# Patient Record
Sex: FEMALE | Race: WHITE | URBAN - METROPOLITAN AREA
[De-identification: names, ages, dates, MRNs, and addresses within clinical notes are randomized per-mention and may not be internally consistent; named-entity substitution may affect disease eponyms.]

---

## 2022-12-27 ENCOUNTER — OFFICE VISIT (OUTPATIENT)
Dept: SURGERY | Facility: CLINIC | Age: 57
End: 2022-12-27
Payer: COMMERCIAL

## 2022-12-27 VITALS
DIASTOLIC BLOOD PRESSURE: 76 MMHG | HEART RATE: 88 BPM | OXYGEN SATURATION: 98 % | WEIGHT: 157 LBS | RESPIRATION RATE: 16 BRPM | HEIGHT: 65.75 IN | BODY MASS INDEX: 25.53 KG/M2 | TEMPERATURE: 97 F | SYSTOLIC BLOOD PRESSURE: 123 MMHG

## 2022-12-27 DIAGNOSIS — Z01.818 PRE-OP TESTING: Primary | ICD-10-CM

## 2022-12-27 DIAGNOSIS — Z98.890 S/P BREAST RECONSTRUCTION, BILATERAL: ICD-10-CM

## 2022-12-27 PROCEDURE — 3078F DIAST BP <80 MM HG: CPT | Performed by: SURGERY

## 2022-12-27 PROCEDURE — 99243 OFF/OP CNSLTJ NEW/EST LOW 30: CPT | Performed by: SURGERY

## 2022-12-27 PROCEDURE — 3074F SYST BP LT 130 MM HG: CPT | Performed by: SURGERY

## 2022-12-27 PROCEDURE — 3008F BODY MASS INDEX DOCD: CPT | Performed by: SURGERY

## 2022-12-27 RX ORDER — LEVOTHYROXINE SODIUM 88 UG/1
88 TABLET ORAL DAILY
COMMUNITY
Start: 2022-10-23

## 2022-12-27 RX ORDER — MULTIVIT-MIN/IRON/FOLIC ACID/K 18-600-40
CAPSULE ORAL
COMMUNITY

## 2022-12-27 RX ORDER — SULFAMETHOXAZOLE AND TRIMETHOPRIM 800; 160 MG/1; MG/1
1 TABLET ORAL EVERY 12 HOURS
COMMUNITY
Start: 2022-08-10

## 2022-12-27 RX ORDER — AMLODIPINE BESYLATE 10 MG/1
10 TABLET ORAL DAILY
COMMUNITY
Start: 2022-11-07

## 2022-12-27 RX ORDER — ALPRAZOLAM 0.25 MG/1
0.25 TABLET ORAL
COMMUNITY
Start: 2022-03-21

## 2022-12-29 ENCOUNTER — TELEPHONE (OUTPATIENT)
Dept: SURGERY | Facility: CLINIC | Age: 57
End: 2022-12-29

## 2022-12-29 NOTE — TELEPHONE ENCOUNTER
Patient called to state that she is getting her CT done tomorrow. She was inquiring on when to follow up with Dr. Sonja Nicholas. I informed her we will wait for the results of the CT and reach out to her to schedule  a follow up. Patient was appreciative of the assistance and instructed me not to respond to her [employer] message as she no longer needs those questions answered.
Yes

## 2022-12-30 ENCOUNTER — HOSPITAL ENCOUNTER (OUTPATIENT)
Dept: CT IMAGING | Facility: HOSPITAL | Age: 57
Discharge: HOME | End: 2022-12-30
Attending: SURGERY
Payer: COMMERCIAL

## 2022-12-30 DIAGNOSIS — Z01.818 PRE-OP TESTING: ICD-10-CM

## 2022-12-30 LAB
CREAT BLD-MCNC: 0.9 MG/DL
GFR SERPLBLD BASED ON 1.73 SQ M-ARVRAT: 74 ML/MIN/1.73M2 (ref 60–?)

## 2022-12-30 PROCEDURE — 74174 CTA ABD&PLVS W/CONTRAST: CPT | Performed by: SURGERY

## 2022-12-30 PROCEDURE — 82565 ASSAY OF CREATININE: CPT

## 2023-01-06 ENCOUNTER — OFFICE VISIT (OUTPATIENT)
Dept: SURGERY | Facility: CLINIC | Age: 58
End: 2023-01-06
Payer: COMMERCIAL

## 2023-01-06 DIAGNOSIS — Z90.13 S/P MASTECTOMY, BILATERAL: Primary | ICD-10-CM

## 2023-01-06 PROCEDURE — 99213 OFFICE O/P EST LOW 20 MIN: CPT | Performed by: SURGERY

## 2023-01-06 NOTE — PROGRESS NOTES
Ryan Jiang is a 59year old female who presents today for a follow-up. Since her last visit, the patient underwent CT angiogram of the abdominal wall which showed some abdominal perforators and patency of the deep inferior epigastric system. She continues to have a left breast drain in place which is draining approximately 20 cc daily. Physical Examination:  Breasts: Left breast incision is clean dry and intact. Mild erythema is noted surrounding the drain insertion site. There is no left breast erythema noted. Abdomen: Well-healed laparoscopic port sites are noted. A moderate lower abdominal pannus with striations of the abdominal skin is noted. Assessment and Plan:  Surgical treatment options were reviewed with the patient. We again reviewed the option of a latissimus dorsi flap versus abdominal free flap reconstruction. The patient is interested in abdominal free flap reconstruction. The patient and  were counseled that given her previous liposuction, the feasibility of abdominal free flap reconstruction will be dependent depending on operative findings. The patient will continue management of her left breast drain and implant as per Dr. Bibi Sommer. As the patient has a trip planned in the early spring, we discussed proceeding with surgery after her trip. This will allow the swelling from abdominal liposuction completely resolved. The risks of surgery including but not limited to bleeding, infection, scarring, seroma, delayed wound healing, partial/total flap loss, fat necrosis, asymmetry, injury to adjacent structures, abdominal hernia/weakness/bulge, need for further surgery, and venous thromboembolism were reviewed. The expected post-operative course was discussed including the need for activity limitation, drains, and suture removal.   Multiple questions were answered to the patient's satisfaction. No guarantees as to outcome were offered.  The patient and  expressed understanding and wished to proceed. A total of 20 minutes was spent reviewing the patient's diagnostic testing, reviewing reconstructive options, and coordinating the patient's care.

## 2023-01-06 NOTE — PATIENT INSTRUCTIONS
Surgeon:         Dr. Timothy See                                        Tel:         [phone]                                  Fax:        [phone]    Surgery/Procedure:  Delayed breast reconstruction with bilateral LAY FLAP. 12 hours, general anesthesia, admission to ICU. Hospital:  [facility]: [address], [address]           [phone]  [facility]: [address], [facility], [facility]               [phone]    1. Someone will need to drive you to and from the hospital if your procedure is outpatient. 2.Do not drink alcohol or smoke 24 hours prior to your procedure. 3. Bring a picture ID and your insurance card. 4. You will be contacted by the hospital the day before to confirm the procedure time and location. 5. The hospital will also contact you approximately one week before surgery to schedule your COVID test.     6. Do not take any herbal supplements or blood thinners at least one week before your procedure/surgery. This includes NSAID's (aspirin, baby aspirin, Motrin, Ibuprofen, Aleve, Advil, Naproxen, etc), Plavix, fish oil, vitamin E, turmeric, CoQ10, or green tea supplements, etc. *TYLENOL or acetaminophen is ok to take*    7. PRE-OPERATIVE TESTING: History and physical with medical clearance is REQUIRED within 30 days of the surgery date and is mandatory per Dr. Kuldip Skinner. *If this is not done, your surgery will be postponed*  MEDICAL CLEARANCE WITH PCP   CBC  CMP  EKG    8. Please inform us if you develop any Covid-19 like symptoms, test positive or have been exposed for Covid- 19 prior to surgery.      Consent obtained   Photos taken on 1/6/2023

## 2023-01-17 ENCOUNTER — TELEPHONE (OUTPATIENT)
Dept: SURGERY | Facility: CLINIC | Age: 58
End: 2023-01-17

## 2023-01-17 DIAGNOSIS — Z90.13 S/P MASTECTOMY, BILATERAL: Primary | ICD-10-CM

## 2023-01-17 NOTE — TELEPHONE ENCOUNTER
Calling pt in regards to scheduling surgery. Informed pt that I have 06/05/2023 available at [facility] with Dr. Alyssa Musa. Insurance is the same  Pt verbalized understanding and in agreement with date and location. All questions answered. Encouraged pt to call or [employer] message office with any other questions or concerns.

## 2023-05-24 ENCOUNTER — TELEPHONE (OUTPATIENT)
Dept: SURGERY | Facility: CLINIC | Age: 58
End: 2023-05-24

## 2023-05-24 NOTE — TELEPHONE ENCOUNTER
Called Dr. Quiana Vazquez office and spoke to [employer] to request EKG tracing to be faxed to our office. Oneal confirms this will be sent to our office in 48 hours.

## 2023-05-30 ENCOUNTER — TELEPHONE (OUTPATIENT)
Dept: SURGERY | Facility: CLINIC | Age: 58
End: 2023-05-30

## 2023-05-30 DIAGNOSIS — Z98.890 S/P BREAST RECONSTRUCTION, BILATERAL: ICD-10-CM

## 2023-05-30 DIAGNOSIS — Z90.13 S/P MASTECTOMY, BILATERAL: Primary | ICD-10-CM

## 2023-05-30 NOTE — TELEPHONE ENCOUNTER
Spoke to [employer]. at Dr. Analia Alvarenga office to have medical clearance updated with abnormal EKG.

## 2023-05-30 NOTE — TELEPHONE ENCOUNTER
Patient called back and confirmed new surgical date. She was informed that she will need a new medical clearance prior to surgery. Patient was appreciative of the call.

## 2023-07-07 ENCOUNTER — ANESTHESIA EVENT (OUTPATIENT)
Dept: SURGERY | Facility: HOSPITAL | Age: 58
End: 2023-07-07
Payer: COMMERCIAL

## 2023-07-11 ENCOUNTER — PATIENT MESSAGE (OUTPATIENT)
Dept: SURGERY | Facility: CLINIC | Age: 58
End: 2023-07-11

## 2023-07-11 DIAGNOSIS — L03.319 CELLULITIS OF TRUNK, UNSPECIFIED SITE OF TRUNK: Primary | ICD-10-CM

## 2023-07-13 ENCOUNTER — TELEPHONE (OUTPATIENT)
Dept: SURGERY | Facility: CLINIC | Age: 58
End: 2023-07-13

## 2023-07-13 NOTE — TELEPHONE ENCOUNTER
Spoke to More in pre-admission testing who wanted to confirm ERAS protocol with bowel prep. No bowel prep needed for this surgery.

## 2023-07-18 ENCOUNTER — TELEPHONE (OUTPATIENT)
Dept: SURGERY | Facility: CLINIC | Age: 58
End: 2023-07-18

## 2023-07-19 ENCOUNTER — TELEPHONE (OUTPATIENT)
Dept: SURGERY | Facility: CLINIC | Age: 58
End: 2023-07-19

## 2023-07-24 ENCOUNTER — ANESTHESIA (OUTPATIENT)
Dept: SURGERY | Facility: HOSPITAL | Age: 58
End: 2023-07-24
Payer: COMMERCIAL

## 2023-07-24 ENCOUNTER — HOSPITAL ENCOUNTER (INPATIENT)
Facility: HOSPITAL | Age: 58
LOS: 3 days | Discharge: HOME | DRG: 580 | End: 2023-07-27
Attending: SURGERY | Admitting: SURGERY
Payer: COMMERCIAL

## 2023-07-24 DIAGNOSIS — Z90.13 S/P MASTECTOMY, BILATERAL: ICD-10-CM

## 2023-07-24 LAB
ALBUMIN SERPL-MCNC: 3 G/DL (ref 3.4–5)
ALBUMIN/GLOB SERPL: 0.9 {RATIO} (ref 1–2)
ALP LIVER SERPL-CCNC: 78 U/L
ALT SERPL-CCNC: 249 U/L
ANION GAP SERPL CALC-SCNC: 6 MMOL/L (ref 0–18)
AST SERPL-CCNC: 382 U/L (ref 15–37)
BASOPHILS # BLD AUTO: 0.02 X10(3) UL (ref 0–0.2)
BASOPHILS NFR BLD AUTO: 0.1 %
BILIRUB SERPL-MCNC: 0.5 MG/DL (ref 0.1–2)
BUN BLD-MCNC: 11 MG/DL (ref 7–18)
CALCIUM BLD-MCNC: 8.6 MG/DL (ref 8.5–10.1)
CHLORIDE SERPL-SCNC: 109 MMOL/L (ref 98–112)
CO2 SERPL-SCNC: 23 MMOL/L (ref 21–32)
CREAT BLD-MCNC: 0.82 MG/DL
EGFRCR SERPLBLD CKD-EPI 2021: 82 ML/MIN/1.73M2 (ref 60–?)
EOSINOPHIL # BLD AUTO: 0.01 X10(3) UL (ref 0–0.7)
EOSINOPHIL NFR BLD AUTO: 0.1 %
ERYTHROCYTE [DISTWIDTH] IN BLOOD BY AUTOMATED COUNT: 14 %
GLOBULIN PLAS-MCNC: 3.3 G/DL (ref 2.8–4.4)
GLUCOSE BLD-MCNC: 182 MG/DL (ref 70–99)
HCT VFR BLD AUTO: 40.2 %
HGB BLD-MCNC: 13 G/DL
IMM GRANULOCYTES # BLD AUTO: 0.08 X10(3) UL (ref 0–1)
IMM GRANULOCYTES NFR BLD: 0.5 %
LYMPHOCYTES # BLD AUTO: 0.4 X10(3) UL (ref 1–4)
LYMPHOCYTES NFR BLD AUTO: 2.5 %
MCH RBC QN AUTO: 28.1 PG (ref 26–34)
MCHC RBC AUTO-ENTMCNC: 32.3 G/DL (ref 31–37)
MCV RBC AUTO: 86.8 FL
MONOCYTES # BLD AUTO: 0.89 X10(3) UL (ref 0.1–1)
MONOCYTES NFR BLD AUTO: 5.7 %
NEUTROPHILS # BLD AUTO: 14.31 X10 (3) UL (ref 1.5–7.7)
NEUTROPHILS # BLD AUTO: 14.31 X10(3) UL (ref 1.5–7.7)
NEUTROPHILS NFR BLD AUTO: 91.1 %
OSMOLALITY SERPL CALC.SUM OF ELEC: 290 MOSM/KG (ref 275–295)
PLATELET # BLD AUTO: 193 10(3)UL (ref 150–450)
POTASSIUM SERPL-SCNC: 3.8 MMOL/L (ref 3.5–5.1)
PROT SERPL-MCNC: 6.3 G/DL (ref 6.4–8.2)
RBC # BLD AUTO: 4.63 X10(6)UL
SODIUM SERPL-SCNC: 138 MMOL/L (ref 136–145)
WBC # BLD AUTO: 15.7 X10(3) UL (ref 4–11)

## 2023-07-24 PROCEDURE — 0WQF0ZZ REPAIR ABDOMINAL WALL, OPEN APPROACH: ICD-10-PCS | Performed by: SURGERY

## 2023-07-24 PROCEDURE — 3008F BODY MASS INDEX DOCD: CPT | Performed by: NURSE PRACTITIONER

## 2023-07-24 PROCEDURE — 0KQL0ZZ REPAIR LEFT ABDOMEN MUSCLE, OPEN APPROACH: ICD-10-PCS | Performed by: SURGERY

## 2023-07-24 PROCEDURE — 0KXF0Z7 TRANSFER RIGHT TRUNK MUSCLE, DEEP INFERIOR EPIGASTRIC ARTERY PERFORATOR FLAP, OPEN APPROACH: ICD-10-PCS | Performed by: SURGERY

## 2023-07-24 PROCEDURE — 0KXG0Z7 TRANSFER LEFT TRUNK MUSCLE, DEEP INFERIOR EPIGASTRIC ARTERY PERFORATOR FLAP, OPEN APPROACH: ICD-10-PCS | Performed by: SURGERY

## 2023-07-24 PROCEDURE — 0HPT0JZ REMOVAL OF SYNTHETIC SUBSTITUTE FROM RIGHT BREAST, OPEN APPROACH: ICD-10-PCS | Performed by: SURGERY

## 2023-07-24 PROCEDURE — 99291 CRITICAL CARE FIRST HOUR: CPT | Performed by: NURSE PRACTITIONER

## 2023-07-24 PROCEDURE — 0HPU0JZ REMOVAL OF SYNTHETIC SUBSTITUTE FROM LEFT BREAST, OPEN APPROACH: ICD-10-PCS | Performed by: SURGERY

## 2023-07-24 PROCEDURE — 76942 ECHO GUIDE FOR BIOPSY: CPT | Performed by: ANESTHESIOLOGY

## 2023-07-24 PROCEDURE — 3074F SYST BP LT 130 MM HG: CPT | Performed by: NURSE PRACTITIONER

## 2023-07-24 PROCEDURE — 3078F DIAST BP <80 MM HG: CPT | Performed by: NURSE PRACTITIONER

## 2023-07-24 PROCEDURE — 3E0T3BZ INTRODUCTION OF ANESTHETIC AGENT INTO PERIPHERAL NERVES AND PLEXI, PERCUTANEOUS APPROACH: ICD-10-PCS | Performed by: SURGERY

## 2023-07-24 RX ORDER — DEXAMETHASONE SODIUM PHOSPHATE 10 MG/ML
INJECTION, SOLUTION INTRAMUSCULAR; INTRAVENOUS AS NEEDED
Status: DISCONTINUED | OUTPATIENT
Start: 2023-07-24 | End: 2023-07-24 | Stop reason: SURG

## 2023-07-24 RX ORDER — HYDROMORPHONE HYDROCHLORIDE 1 MG/ML
0.2 INJECTION, SOLUTION INTRAMUSCULAR; INTRAVENOUS; SUBCUTANEOUS EVERY 5 MIN PRN
Status: ACTIVE | OUTPATIENT
Start: 2023-07-24 | End: 2023-07-25

## 2023-07-24 RX ORDER — OXYCODONE HYDROCHLORIDE 5 MG/1
5 TABLET ORAL EVERY 4 HOURS PRN
Status: DISCONTINUED | OUTPATIENT
Start: 2023-07-24 | End: 2023-07-27

## 2023-07-24 RX ORDER — CARBOXYMETHYLCELLULOSE SODIUM 10 MG/ML
GEL OPHTHALMIC AS NEEDED
Status: DISCONTINUED | OUTPATIENT
Start: 2023-07-24 | End: 2023-07-24 | Stop reason: SURG

## 2023-07-24 RX ORDER — MIDAZOLAM HYDROCHLORIDE 1 MG/ML
INJECTION INTRAMUSCULAR; INTRAVENOUS AS NEEDED
Status: DISCONTINUED | OUTPATIENT
Start: 2023-07-24 | End: 2023-07-24 | Stop reason: SURG

## 2023-07-24 RX ORDER — HEPARIN SODIUM 5000 [USP'U]/ML
INJECTION, SOLUTION INTRAVENOUS; SUBCUTANEOUS
Status: COMPLETED
Start: 2023-07-24 | End: 2023-07-24

## 2023-07-24 RX ORDER — ONDANSETRON 2 MG/ML
INJECTION INTRAMUSCULAR; INTRAVENOUS AS NEEDED
Status: DISCONTINUED | OUTPATIENT
Start: 2023-07-24 | End: 2023-07-24 | Stop reason: SURG

## 2023-07-24 RX ORDER — ACETAMINOPHEN 325 MG/1
650 TABLET ORAL 4 TIMES DAILY
Status: DISCONTINUED | OUTPATIENT
Start: 2023-07-24 | End: 2023-07-26

## 2023-07-24 RX ORDER — SODIUM CHLORIDE, SODIUM LACTATE, POTASSIUM CHLORIDE, CALCIUM CHLORIDE 600; 310; 30; 20 MG/100ML; MG/100ML; MG/100ML; MG/100ML
INJECTION, SOLUTION INTRAVENOUS CONTINUOUS
Status: DISCONTINUED | OUTPATIENT
Start: 2023-07-24 | End: 2023-07-26

## 2023-07-24 RX ORDER — HEPARIN SODIUM 5000 [USP'U]/ML
5000 INJECTION, SOLUTION INTRAVENOUS; SUBCUTANEOUS ONCE
Status: COMPLETED | OUTPATIENT
Start: 2023-07-24 | End: 2023-07-24

## 2023-07-24 RX ORDER — DOCUSATE SODIUM 100 MG/1
100 CAPSULE, LIQUID FILLED ORAL 2 TIMES DAILY
Status: DISCONTINUED | OUTPATIENT
Start: 2023-07-25 | End: 2023-07-27

## 2023-07-24 RX ORDER — ROCURONIUM BROMIDE 10 MG/ML
INJECTION, SOLUTION INTRAVENOUS AS NEEDED
Status: DISCONTINUED | OUTPATIENT
Start: 2023-07-24 | End: 2023-07-24 | Stop reason: SURG

## 2023-07-24 RX ORDER — DEXAMETHASONE SODIUM PHOSPHATE 4 MG/ML
VIAL (ML) INJECTION AS NEEDED
Status: DISCONTINUED | OUTPATIENT
Start: 2023-07-24 | End: 2023-07-24 | Stop reason: SURG

## 2023-07-24 RX ORDER — OXYCODONE HYDROCHLORIDE 5 MG/1
10 TABLET ORAL EVERY 4 HOURS PRN
Status: DISCONTINUED | OUTPATIENT
Start: 2023-07-24 | End: 2023-07-27

## 2023-07-24 RX ORDER — MIDAZOLAM HYDROCHLORIDE 1 MG/ML
1 INJECTION INTRAMUSCULAR; INTRAVENOUS EVERY 5 MIN PRN
Status: ACTIVE | OUTPATIENT
Start: 2023-07-24 | End: 2023-07-25

## 2023-07-24 RX ORDER — HYDROMORPHONE HYDROCHLORIDE 1 MG/ML
0.6 INJECTION, SOLUTION INTRAMUSCULAR; INTRAVENOUS; SUBCUTANEOUS EVERY 5 MIN PRN
Status: ACTIVE | OUTPATIENT
Start: 2023-07-24 | End: 2023-07-25

## 2023-07-24 RX ORDER — DOCUSATE SODIUM 100 MG/1
100 CAPSULE, LIQUID FILLED ORAL 2 TIMES DAILY
Qty: 40 CAPSULE | Refills: 1 | Status: SHIPPED | OUTPATIENT
Start: 2023-07-24

## 2023-07-24 RX ORDER — DIPHENHYDRAMINE HYDROCHLORIDE 50 MG/ML
12.5 INJECTION INTRAMUSCULAR; INTRAVENOUS EVERY 6 HOURS PRN
Status: DISCONTINUED | OUTPATIENT
Start: 2023-07-24 | End: 2023-07-27

## 2023-07-24 RX ORDER — CYCLOBENZAPRINE HCL 5 MG
5 TABLET ORAL 3 TIMES DAILY PRN
Status: DISCONTINUED | OUTPATIENT
Start: 2023-07-24 | End: 2023-07-27

## 2023-07-24 RX ORDER — ONDANSETRON 2 MG/ML
4 INJECTION INTRAMUSCULAR; INTRAVENOUS EVERY 6 HOURS PRN
Status: DISCONTINUED | OUTPATIENT
Start: 2023-07-24 | End: 2023-07-27

## 2023-07-24 RX ORDER — ASPIRIN 81 MG/1
81 TABLET, CHEWABLE ORAL DAILY
Status: DISCONTINUED | OUTPATIENT
Start: 2023-07-25 | End: 2023-07-27

## 2023-07-24 RX ORDER — VERAPAMIL HYDROCHLORIDE 2.5 MG/ML
INJECTION, SOLUTION INTRAVENOUS AS NEEDED
Status: DISCONTINUED | OUTPATIENT
Start: 2023-07-24 | End: 2023-07-24 | Stop reason: HOSPADM

## 2023-07-24 RX ORDER — ONDANSETRON 4 MG/1
4 TABLET, ORALLY DISINTEGRATING ORAL EVERY 6 HOURS PRN
Status: DISCONTINUED | OUTPATIENT
Start: 2023-07-24 | End: 2023-07-27

## 2023-07-24 RX ORDER — ACETAMINOPHEN 10 MG/ML
INJECTION, SOLUTION INTRAVENOUS AS NEEDED
Status: DISCONTINUED | OUTPATIENT
Start: 2023-07-24 | End: 2023-07-24 | Stop reason: SURG

## 2023-07-24 RX ORDER — ACETAMINOPHEN 500 MG
1000 TABLET ORAL ONCE
Status: DISCONTINUED | OUTPATIENT
Start: 2023-07-24 | End: 2023-07-24 | Stop reason: HOSPADM

## 2023-07-24 RX ORDER — SODIUM CHLORIDE, SODIUM LACTATE, POTASSIUM CHLORIDE, CALCIUM CHLORIDE 600; 310; 30; 20 MG/100ML; MG/100ML; MG/100ML; MG/100ML
INJECTION, SOLUTION INTRAVENOUS CONTINUOUS
Status: DISCONTINUED | OUTPATIENT
Start: 2023-07-24 | End: 2023-07-27

## 2023-07-24 RX ORDER — CEFAZOLIN SODIUM/WATER 2 G/20 ML
2 SYRINGE (ML) INTRAVENOUS EVERY 8 HOURS
Status: COMPLETED | OUTPATIENT
Start: 2023-07-24 | End: 2023-07-25

## 2023-07-24 RX ORDER — HYDROMORPHONE HYDROCHLORIDE 1 MG/ML
0.8 INJECTION, SOLUTION INTRAMUSCULAR; INTRAVENOUS; SUBCUTANEOUS EVERY 2 HOUR PRN
Status: DISCONTINUED | OUTPATIENT
Start: 2023-07-24 | End: 2023-07-27

## 2023-07-24 RX ORDER — HYDROMORPHONE HYDROCHLORIDE 1 MG/ML
0.4 INJECTION, SOLUTION INTRAMUSCULAR; INTRAVENOUS; SUBCUTANEOUS EVERY 5 MIN PRN
Status: ACTIVE | OUTPATIENT
Start: 2023-07-24 | End: 2023-07-25

## 2023-07-24 RX ORDER — GLYCOPYRROLATE 0.2 MG/ML
INJECTION, SOLUTION INTRAMUSCULAR; INTRAVENOUS AS NEEDED
Status: DISCONTINUED | OUTPATIENT
Start: 2023-07-24 | End: 2023-07-24 | Stop reason: SURG

## 2023-07-24 RX ORDER — PROCHLORPERAZINE EDISYLATE 5 MG/ML
5 INJECTION INTRAMUSCULAR; INTRAVENOUS EVERY 8 HOURS PRN
Status: DISCONTINUED | OUTPATIENT
Start: 2023-07-24 | End: 2023-07-27

## 2023-07-24 RX ORDER — ONDANSETRON 4 MG/1
4 TABLET, FILM COATED ORAL EVERY 8 HOURS PRN
Qty: 20 TABLET | Refills: 1 | Status: SHIPPED | OUTPATIENT
Start: 2023-07-24

## 2023-07-24 RX ORDER — KETAMINE HYDROCHLORIDE 50 MG/ML
INJECTION, SOLUTION, CONCENTRATE INTRAMUSCULAR; INTRAVENOUS AS NEEDED
Status: DISCONTINUED | OUTPATIENT
Start: 2023-07-24 | End: 2023-07-24 | Stop reason: SURG

## 2023-07-24 RX ORDER — DIPHENHYDRAMINE HCL 25 MG
25 CAPSULE ORAL EVERY 6 HOURS PRN
Status: DISCONTINUED | OUTPATIENT
Start: 2023-07-24 | End: 2023-07-27

## 2023-07-24 RX ORDER — LIDOCAINE HYDROCHLORIDE 10 MG/ML
INJECTION, SOLUTION EPIDURAL; INFILTRATION; INTRACAUDAL; PERINEURAL AS NEEDED
Status: DISCONTINUED | OUTPATIENT
Start: 2023-07-24 | End: 2023-07-24 | Stop reason: SURG

## 2023-07-24 RX ORDER — EPHEDRINE SULFATE 50 MG/ML
INJECTION INTRAVENOUS AS NEEDED
Status: DISCONTINUED | OUTPATIENT
Start: 2023-07-24 | End: 2023-07-24 | Stop reason: SURG

## 2023-07-24 RX ORDER — NEOSTIGMINE METHYLSULFATE 1 MG/ML
INJECTION, SOLUTION INTRAVENOUS AS NEEDED
Status: DISCONTINUED | OUTPATIENT
Start: 2023-07-24 | End: 2023-07-24 | Stop reason: SURG

## 2023-07-24 RX ORDER — CEFAZOLIN SODIUM/WATER 2 G/20 ML
SYRINGE (ML) INTRAVENOUS
Status: COMPLETED
Start: 2023-07-24 | End: 2023-07-24

## 2023-07-24 RX ORDER — PANTOPRAZOLE SODIUM 40 MG/1
40 TABLET, DELAYED RELEASE ORAL
Status: DISCONTINUED | OUTPATIENT
Start: 2023-07-25 | End: 2023-07-27

## 2023-07-24 RX ORDER — CEFAZOLIN SODIUM/WATER 2 G/20 ML
2 SYRINGE (ML) INTRAVENOUS ONCE
Status: COMPLETED | OUTPATIENT
Start: 2023-07-24 | End: 2023-07-24

## 2023-07-24 RX ORDER — HYDROMORPHONE HYDROCHLORIDE 1 MG/ML
0.4 INJECTION, SOLUTION INTRAMUSCULAR; INTRAVENOUS; SUBCUTANEOUS EVERY 2 HOUR PRN
Status: DISCONTINUED | OUTPATIENT
Start: 2023-07-24 | End: 2023-07-27

## 2023-07-24 RX ORDER — HEPARIN SODIUM 10000 [USP'U]/ML
INJECTION, SOLUTION INTRAVENOUS; SUBCUTANEOUS AS NEEDED
Status: DISCONTINUED | OUTPATIENT
Start: 2023-07-24 | End: 2023-07-24 | Stop reason: HOSPADM

## 2023-07-24 RX ORDER — LIDOCAINE HYDROCHLORIDE ANHYDROUS AND DEXTROSE MONOHYDRATE .8; 5 G/100ML; G/100ML
INJECTION, SOLUTION INTRAVENOUS CONTINUOUS PRN
Status: DISCONTINUED | OUTPATIENT
Start: 2023-07-24 | End: 2023-07-24 | Stop reason: SURG

## 2023-07-24 RX ORDER — MEPERIDINE HYDROCHLORIDE 25 MG/ML
12.5 INJECTION INTRAMUSCULAR; INTRAVENOUS; SUBCUTANEOUS AS NEEDED
Status: DISCONTINUED | OUTPATIENT
Start: 2023-07-24 | End: 2023-07-27

## 2023-07-24 RX ORDER — DIPHENHYDRAMINE HYDROCHLORIDE 50 MG/ML
12.5 INJECTION INTRAMUSCULAR; INTRAVENOUS AS NEEDED
Status: ACTIVE | OUTPATIENT
Start: 2023-07-24 | End: 2023-07-25

## 2023-07-24 RX ORDER — HYDROCODONE BITARTRATE AND ACETAMINOPHEN 5; 325 MG/1; MG/1
1-2 TABLET ORAL EVERY 6 HOURS PRN
Qty: 40 TABLET | Refills: 0 | Status: SHIPPED | OUTPATIENT
Start: 2023-07-24

## 2023-07-24 RX ORDER — ENOXAPARIN SODIUM 100 MG/ML
40 INJECTION SUBCUTANEOUS DAILY
Status: DISCONTINUED | OUTPATIENT
Start: 2023-07-25 | End: 2023-07-27

## 2023-07-24 RX ORDER — ASPIRIN 81 MG/1
81 TABLET ORAL DAILY
Qty: 14 TABLET | Refills: 0 | Status: SHIPPED | OUTPATIENT
Start: 2023-07-24

## 2023-07-24 RX ORDER — SCOLOPAMINE TRANSDERMAL SYSTEM 1 MG/1
1 PATCH, EXTENDED RELEASE TRANSDERMAL ONCE
Status: COMPLETED | OUTPATIENT
Start: 2023-07-24 | End: 2023-07-24

## 2023-07-24 RX ORDER — ONDANSETRON 2 MG/ML
4 INJECTION INTRAMUSCULAR; INTRAVENOUS EVERY 6 HOURS PRN
Status: DISCONTINUED | OUTPATIENT
Start: 2023-07-24 | End: 2023-07-24 | Stop reason: DRUGHIGH

## 2023-07-24 RX ORDER — ASPIRIN 300 MG/1
SUPPOSITORY RECTAL AS NEEDED
Status: DISCONTINUED | OUTPATIENT
Start: 2023-07-24 | End: 2023-07-24 | Stop reason: HOSPADM

## 2023-07-24 RX ADMIN — ROCURONIUM BROMIDE 10 MG: 10 INJECTION, SOLUTION INTRAVENOUS at 14:34:00

## 2023-07-24 RX ADMIN — KETAMINE HYDROCHLORIDE 50 MG: 50 INJECTION, SOLUTION, CONCENTRATE INTRAMUSCULAR; INTRAVENOUS at 07:55:00

## 2023-07-24 RX ADMIN — ONDANSETRON 4 MG: 2 INJECTION INTRAMUSCULAR; INTRAVENOUS at 17:06:00

## 2023-07-24 RX ADMIN — CEFAZOLIN SODIUM/WATER 2 G: 2 G/20 ML SYRINGE (ML) INTRAVENOUS at 15:47:00

## 2023-07-24 RX ADMIN — ROCURONIUM BROMIDE 20 MG: 10 INJECTION, SOLUTION INTRAVENOUS at 13:17:00

## 2023-07-24 RX ADMIN — ACETAMINOPHEN 1000 MG: 10 INJECTION, SOLUTION INTRAVENOUS at 16:45:00

## 2023-07-24 RX ADMIN — CARBOXYMETHYLCELLULOSE SODIUM 1 DROP: 10 GEL OPHTHALMIC at 07:44:00

## 2023-07-24 RX ADMIN — SODIUM CHLORIDE, SODIUM LACTATE, POTASSIUM CHLORIDE, CALCIUM CHLORIDE: 600; 310; 30; 20 INJECTION, SOLUTION INTRAVENOUS at 07:35:00

## 2023-07-24 RX ADMIN — DEXAMETHASONE SODIUM PHOSPHATE 8 MG: 4 MG/ML VIAL (ML) INJECTION at 07:54:00

## 2023-07-24 RX ADMIN — SODIUM CHLORIDE, SODIUM LACTATE, POTASSIUM CHLORIDE, CALCIUM CHLORIDE: 600; 310; 30; 20 INJECTION, SOLUTION INTRAVENOUS at 09:58:00

## 2023-07-24 RX ADMIN — MIDAZOLAM HYDROCHLORIDE 4 MG: 1 INJECTION INTRAMUSCULAR; INTRAVENOUS at 07:34:00

## 2023-07-24 RX ADMIN — ROCURONIUM BROMIDE 80 MG: 10 INJECTION, SOLUTION INTRAVENOUS at 07:44:00

## 2023-07-24 RX ADMIN — ONDANSETRON 4 MG: 2 INJECTION INTRAMUSCULAR; INTRAVENOUS at 16:32:00

## 2023-07-24 RX ADMIN — EPHEDRINE SULFATE 5 MG: 50 INJECTION INTRAVENOUS at 12:57:00

## 2023-07-24 RX ADMIN — CEFAZOLIN SODIUM/WATER 2 G: 2 G/20 ML SYRINGE (ML) INTRAVENOUS at 11:52:00

## 2023-07-24 RX ADMIN — ROCURONIUM BROMIDE 20 MG: 10 INJECTION, SOLUTION INTRAVENOUS at 11:46:00

## 2023-07-24 RX ADMIN — SODIUM CHLORIDE, SODIUM LACTATE, POTASSIUM CHLORIDE, CALCIUM CHLORIDE: 600; 310; 30; 20 INJECTION, SOLUTION INTRAVENOUS at 11:00:00

## 2023-07-24 RX ADMIN — ROCURONIUM BROMIDE 20 MG: 10 INJECTION, SOLUTION INTRAVENOUS at 10:19:00

## 2023-07-24 RX ADMIN — LIDOCAINE HYDROCHLORIDE ANHYDROUS AND DEXTROSE MONOHYDRATE 2 MG/KG/HR: .8; 5 INJECTION, SOLUTION INTRAVENOUS at 07:50:00

## 2023-07-24 RX ADMIN — SCOLOPAMINE TRANSDERMAL SYSTEM 1 PATCH: 1 PATCH, EXTENDED RELEASE TRANSDERMAL at 08:01:00

## 2023-07-24 RX ADMIN — SODIUM CHLORIDE, SODIUM LACTATE, POTASSIUM CHLORIDE, CALCIUM CHLORIDE: 600; 310; 30; 20 INJECTION, SOLUTION INTRAVENOUS at 17:04:00

## 2023-07-24 RX ADMIN — LIDOCAINE HYDROCHLORIDE 50 MG: 10 INJECTION, SOLUTION EPIDURAL; INFILTRATION; INTRACAUDAL; PERINEURAL at 07:43:00

## 2023-07-24 RX ADMIN — DEXAMETHASONE SODIUM PHOSPHATE 4 MG: 10 INJECTION, SOLUTION INTRAMUSCULAR; INTRAVENOUS at 17:03:00

## 2023-07-24 RX ADMIN — GLYCOPYRROLATE 0.4 MG: 0.2 INJECTION, SOLUTION INTRAMUSCULAR; INTRAVENOUS at 16:44:00

## 2023-07-24 RX ADMIN — CEFAZOLIN SODIUM/WATER 2 G: 2 G/20 ML SYRINGE (ML) INTRAVENOUS at 07:59:00

## 2023-07-24 RX ADMIN — SODIUM CHLORIDE, SODIUM LACTATE, POTASSIUM CHLORIDE, CALCIUM CHLORIDE: 600; 310; 30; 20 INJECTION, SOLUTION INTRAVENOUS at 16:00:00

## 2023-07-24 RX ADMIN — SODIUM CHLORIDE, SODIUM LACTATE, POTASSIUM CHLORIDE, CALCIUM CHLORIDE: 600; 310; 30; 20 INJECTION, SOLUTION INTRAVENOUS at 14:00:00

## 2023-07-24 RX ADMIN — ROCURONIUM BROMIDE 20 MG: 10 INJECTION, SOLUTION INTRAVENOUS at 09:19:00

## 2023-07-24 RX ADMIN — NEOSTIGMINE METHYLSULFATE 4 MG: 1 INJECTION, SOLUTION INTRAVENOUS at 16:44:00

## 2023-07-24 NOTE — BRIEF OP NOTE
Pre-Operative Diagnosis: S/P mastectomy, bilateral [Z90.13]     Post-Operative Diagnosis: S/P mastectomy, bilateral [Z90.13]      Procedure Performed:   Delayed breast reconstruction with bilateral Deep inferior epigastric   flap.     Surgeon(s) and Role:     * Ibeth Nunez MD - Primary     * Katie Eckert MD - Assisting Surgeon    Assistant(s):  Surgical Assistant.: Benedicto Barnes     Surgical Findings: Flaps well-perfused at completion     Specimen: bilateral breast tissue     Estimated Blood Loss:50ml      Etta Tavera MD  7/24/2023  5:33 PM

## 2023-07-24 NOTE — H&P
The plan for removal of bilateral breast implants and delayed reconstruction with abdominal free flap reviewed with patient and . Given her previous lower abdominal liposuction, we discussed possible inability to perform flap based reconstruction. In this event, the patient wishes to proceed with capsulectomy and implant removal for a flap closure. The general risks of surgery including but not limited to bleeding, infection, scarring, seroma, asymmetry, partial/total flap loss, fat necrosis, asymmetry, injury to adjacent structures, abdominal hernia/weakness/bulge, need for further surgery, and venous thromboembolism were reviewed. The expected post-operative course was reviewed. Questions were answered to the patient and 's satisfaction. No guarantees as to outcome were offered. The patient expresses understanding and wishes to proceed.

## 2023-07-24 NOTE — ANESTHESIA PROCEDURE NOTES
Regional Block    Performed by: Katharine Motley MD  Authorized by: Katharine Motley MD      General Information and Staff    Start Time:   Anesthesiologist:  Katharine Motley MD  Performed by: Anesthesiologist  Patient Location:  OR    Block Placement: Post Induction  Site Identification: real time ultrasound guided and image stored and retrievable    Block site/laterality marked before start: site marked  Reason for Block: at surgeon's request and post-op pain management    Preanesthetic Checklist: 2 patient identifers, IV checked, site marked, risks and benefits discussed, monitors and equipment checked, pre-op evaluation, timeout performed, anesthesia consent, sterile technique used, no prohibitive neurological deficits and no local skin infection at insertion site      Procedure Details    Patient Position:  Supine  Prep: ChloraPrep    Monitoring:  Heart rate, cardiac monitor, continuous pulse ox and blood pressure cuff  Block Type:  TAP  Laterality:  Bilateral  Injection Technique:  Single-shot    Needle    Needle Type:  Echogenic  Needle Gauge:  21 G  Needle length: 110 mm. Needle Localization:  Ultrasound guidance  Reason for Ultrasound Use: appropriate spread of the medication was noted in real time and no ultrasound evidence of intravascular and/or intraneural injection        Muscle Twitch Response Comment: no response    Assessment    Injection Assessment:  Good spread noted, incremental injection, local visualized surrounding nerve on ultrasound, low pressure, negative aspiration for heme and negative resistance  Heart Rate Change: No    - Patient tolerated block procedure well without evidence of immediate block related complications.      Medications      Additional Comments    Ropivacaine 0.25% 30cc, decadron 2 mg PF each side SPECIFIC INSTRUCTIONS BELOW Unithroid  150   mcg  A day, BMN   on empty stomach with water only, no other meds or food or drinks   For next half hour Take any kind of vitamins, calcium, iron   Pills  4 hours later 
 
---------------------------------------------------------------------- 
 
 
PAY ATTENTION TO THESE GENERAL INSTRUCTIONS  
 
-ANY tests other than blood work, which you opt to do  outside [facility] imaging facilities, you are responsible for prior authorizations if  required  
- [phone] [facility] AVS- PLEASE IGNORE  
- YOUR MED LIST IS NOT UP TO DATE AS SOME CHANGES ARE BEING MADE AFTER THE VISIT - [facility] Results *Normal results will not be notified by a phone call starting January 1 2021 *If you have an upcoming visit, the results will be discussed at the visit *Please sign up for MY CHART if you want access to your lab and test results *Abnormal results which require immediate attention will be notified by phone call *Abnormal results which do not require immediate assistance will be notified in 1-2 weeks Refills    -    have your pharmacy send us a refill request 
Phone calls  -  Allow  24 hrs. for non-urgent calls to be returned Prior authorization - It may take 2-4 weeks to process Forms  -  FMLA, DMV etc., will take up to 2 weeks to process Cancellations - please notify the office 2 days in advance Samples  - will only be dispensed at visits  
 
--------------------------------------------------------------------------------------------

## 2023-07-24 NOTE — DISCHARGE INSTRUCTIONS
Plastic Surgery Home Care Instructions      We hope you were pleased with your care at [facility].  We wish you the best outcome and overall experience with your operation. These instructions will help to minimize pain, optimize healing, and improve the likelihood of a successful result. What To Expect  There will be some spotting of the incision lines for the next 1-2 weeks. Your breasts will be swollen and might feel congested for the next 1-2 weeks  Temporary areas of numbness are typical in the early weeks following a breast procedure. Normalization of sensation can typically take up to several months following the operation. Your abdomen, back, chest, shoulder will be sore and stiff. Stretching exercises and other treatments will be recommended in the future as you heal.    Bandages (Dressing)  Keep dressings clean and dry  Do NOT wear a bra or allow any type of compression on your reconstructed breasts for 6 weeks  You can reinforce the drain site dressings if you need to with new clean and dry ones that the hospital will have  All incisions can be left open to air. Avoid allowing the incisions, especially your abdominal incision, to rub on your clothes (band of your pants, etc...)  Leave steri-strips on abdominal incision in place. If these fall off on their own, that is ok. If the steri-strips fall off, apply antibiotic ointment (neosporin, bacitracin etc) daily. Apply antibiotic ointment daily to umbilicus. Drain Care  Proper drain care is an important part of your home care and will help to prevent fluid collection, minimize the risk for infection, and increase the success of your breast reconstruction. Empty your drains at 8 am and 8 pm each day  Record each drain separately and use the drain sheet given to you to record the drainage. Follow the instructions on the drain sheet.   Wash your hands before and after emptying your drains  Bring drain sheet with you to your first office visit    Bathing/Showers  You can resume showering when you return home from the hospital. When you shower, leave the clear waterproof dressings over your drain sites. Let the soap and water gently run over everything. Do not scrub your incisions. After the shower, pat dry. Reinforce drain sites with additional waterproof dressings if needed. These will be given to you by the hospital. If some water gets underneath the dressing during the shower, do not worry, just replace with a new dry waterproof dressing. No baths, swimming, or hot tubs until you receive medical permission    Pain Medication: Norco  Take one or two tablets every six hours as needed for pain. Do not take narcotics, if you do not have pain. Keep stools soft. Take a stool softener (Metamucil, Milk of Magnesia, Colace). Eating fruit will also help to prevent constipation    New Prescriptions  Aspirin 81mg daily for 14 days  Zofran (anti-nausea medication if needed)  Colace (stool softener to help with constipation)  Follow instructions on prescriptions    Home Medication  Resume your home medications as instructed  Do not resume herbal medications for two weeks    Diet  Resume your normal diet    Activity  No strenuous activity or lifting more than 5 pounds  Try to not activate your abdominal muscles or pectoralis muscles  You can go up and down the stairs as tolerated. You cannot return to work, if your work requires strenuous activity. You cannot return to physical exercise, sports, or gym workouts until you are allowed to participate in strenuous activity. Driving  Do not drive, if you are taking pain medication. Return to Work or [employer] can return to work when you are not taking pain medication, if your work does not involve strenuous activity. Contact the office, if you need a medical note.      Follow-up Appointment   Our office will call you to set up a time  At your 1st postoperative office visit:  Your drains will be examined, wounds will be evaluated, healing assessed, and any additional concerns and instructions will be discussed. Questions or Concerns  Call Dr. Mar Fallon office if you experience severe pain not controlled by pain medication, swelling, numbness, tingling, bleeding, fever, or other concerns. Call with sudden discoloration or swelling of the flap    Hollie Grajeda   Thank you for coming to [facility] for your operation. The nurses and the anesthesiologist try very hard to make sure you receive the best care possible. Your trust in them is greatly appreciated.     Thanks so much,  Dr. Sia Kimball, PAPercyC  Rosana Avila, RAMILAP-C'

## 2023-07-24 NOTE — ANESTHESIA PROCEDURE NOTES
Airway  Date/Time: 7/24/2023 7:47 AM  Urgency: elective      General Information and Staff    Patient location during procedure: OR  Anesthesiologist: Cj Sumner MD  Resident/CRNA: Francisco Andrade CRNA  Performed: CRNA   Performed by: Francisco Andrade CRNA  Authorized by: Francisco Andrade CRNA      Indications and Patient Condition  Indications for airway management: anesthesia  Sedation level: deep  Preoxygenated: yes  Patient position: sniffing  Mask difficulty assessment: 1 - vent by mask    Final Airway Details  Final airway type: endotracheal airway      Successful airway: ETT  Cuffed: yes   Successful intubation technique: Video laryngoscopy  Endotracheal tube insertion site: oral  Blade type: Onescope.   Blade size: #3  ETT size (mm): 7.0    Cormack-Lehane Classification: grade I - full view of glottis  Placement verified by: capnometry   Measured from: lips  ETT to lips (cm): 21  Number of attempts at approach: 1

## 2023-07-25 LAB
ALBUMIN SERPL-MCNC: 2.8 G/DL (ref 3.4–5)
ALBUMIN/GLOB SERPL: 1 {RATIO} (ref 1–2)
ALP LIVER SERPL-CCNC: 71 U/L
ALT SERPL-CCNC: 188 U/L
ANION GAP SERPL CALC-SCNC: 3 MMOL/L (ref 0–18)
AST SERPL-CCNC: 210 U/L (ref 15–37)
BASOPHILS # BLD AUTO: 0.02 X10(3) UL (ref 0–0.2)
BASOPHILS NFR BLD AUTO: 0.1 %
BILIRUB SERPL-MCNC: 0.4 MG/DL (ref 0.1–2)
BUN BLD-MCNC: 11 MG/DL (ref 7–18)
CALCIUM BLD-MCNC: 8.7 MG/DL (ref 8.5–10.1)
CHLORIDE SERPL-SCNC: 111 MMOL/L (ref 98–112)
CO2 SERPL-SCNC: 26 MMOL/L (ref 21–32)
CREAT BLD-MCNC: 0.75 MG/DL
EGFRCR SERPLBLD CKD-EPI 2021: 92 ML/MIN/1.73M2 (ref 60–?)
EOSINOPHIL # BLD AUTO: 0 X10(3) UL (ref 0–0.7)
EOSINOPHIL NFR BLD AUTO: 0 %
ERYTHROCYTE [DISTWIDTH] IN BLOOD BY AUTOMATED COUNT: 14.3 %
GLOBULIN PLAS-MCNC: 2.9 G/DL (ref 2.8–4.4)
GLUCOSE BLD-MCNC: 129 MG/DL (ref 70–99)
HCT VFR BLD AUTO: 37 %
HGB BLD-MCNC: 12.4 G/DL
IMM GRANULOCYTES # BLD AUTO: 0.06 X10(3) UL (ref 0–1)
IMM GRANULOCYTES NFR BLD: 0.4 %
LYMPHOCYTES # BLD AUTO: 0.87 X10(3) UL (ref 1–4)
LYMPHOCYTES NFR BLD AUTO: 6.2 %
MAGNESIUM SERPL-MCNC: 1.9 MG/DL (ref 1.6–2.6)
MCH RBC QN AUTO: 28 PG (ref 26–34)
MCHC RBC AUTO-ENTMCNC: 33.5 G/DL (ref 31–37)
MCV RBC AUTO: 83.5 FL
MONOCYTES # BLD AUTO: 1.16 X10(3) UL (ref 0.1–1)
MONOCYTES NFR BLD AUTO: 8.3 %
NEUTROPHILS # BLD AUTO: 11.82 X10 (3) UL (ref 1.5–7.7)
NEUTROPHILS # BLD AUTO: 11.82 X10(3) UL (ref 1.5–7.7)
NEUTROPHILS NFR BLD AUTO: 85 %
OSMOLALITY SERPL CALC.SUM OF ELEC: 291 MOSM/KG (ref 275–295)
PLATELET # BLD AUTO: 199 10(3)UL (ref 150–450)
POTASSIUM SERPL-SCNC: 4 MMOL/L (ref 3.5–5.1)
PROT SERPL-MCNC: 5.7 G/DL (ref 6.4–8.2)
RBC # BLD AUTO: 4.43 X10(6)UL
SODIUM SERPL-SCNC: 140 MMOL/L (ref 136–145)
WBC # BLD AUTO: 13.9 X10(3) UL (ref 4–11)

## 2023-07-25 PROCEDURE — 99253 IP/OBS CNSLTJ NEW/EST LOW 45: CPT | Performed by: INTERNAL MEDICINE

## 2023-07-25 NOTE — PLAN OF CARE
Pt received on room air. Denies shortness of breath. C/o left ankle pain-  C/o abdominal/ breast/chest pain with movement. Prn meds given. Dopplers q1- see flowsheet for full documentation. Villalobos intact- UOP < 0.5cc/kg/hr. Nandini Estes updated and 500ml LR bolus given. Pt educated on IS  Updated on plan of care.

## 2023-07-25 NOTE — OPERATIVE REPORT
[address]    PATIENT'S NAME: Hussain Ochoa   ATTENDING PHYSICIAN: Ascencion Zaldivar M.D. OPERATING PHYSICIAN: Olimpia Lynn MD   PATIENT ACCOUNT#:   [id number]    LOCATION:  [address] [place] [address]  MEDICAL RECORD #:   [id number]       YOB: 1965  ADMISSION DATE:       07/24/2023      OPERATION DATE:  07/24/2023    OPERATIVE REPORT      PREOPERATIVE DIAGNOSIS:  Absence of both breasts. POSTOPERATIVE DIAGNOSIS:  Absence of both breasts. PROCEDURE:  Right breast reconstruction with deep inferior epigastric  free flap, (bilateral perforators, 2.5 mm venous ); right breast implant removal; right breast capsulorrhaphy. ASSISTANT SURGEON:  Ascencion Zaldivar M.D. and the second assist.  Both of assistants were critical for the case to assist with microsurgical flap harvest, retraction, microsurgical anastomosis, and wound closure. COMPLICATIONS:  None    BLOOD LOSS:  50 mL. INDICATIONS:  This is a 61-year-old female who underwent bilateral mastectomy and implant-based reconstruction as well as fat grafting by another surgeon. She presented to Dr. Eugenia Zaldivar to discuss flap reconstruction. She was seen in the office and the plan was reviewed. Potential risks, complications, benefits and alternatives were discussed. The patient understands and wishes to proceed. OPERATIVE TECHNIQUE:  Patient was identified in the preoperative area by Dr. Eugenia Zaldivar. The sites were marked. Patient was then brought back to the operating room and placed in supine position. She was adequately padded, and sequential compression devices were applied. General endotracheal anesthesia was administered. Perioperative antibiotics were given. Then, 5000 units of subcutaneous heparin were given in the preop area and 300 mg of rectal aspirin suppository were given. Then, her entire chest, breasts, abdomen, and down to the groin were prepped and draped in the usual sterile fashion.   Then, the procedure was started with the lower abdominal incision using a 10 blade and electrocautery were used to dissect down the subcutaneous fat down to the Babatunde fascia to identify the superficial epigastric vein. This was trace distally. Then, the superior incision was made and the flap was split in the midline and superior umbilical incision was made. Now, the perforators were evaluated. Due to the previous liposuction for the fat grafting for lower abdominal perforators appeared smaller and more scarred. Therefore, 5 lateral row perforators were used for the flap. For the right breast reconstruction, the left hemiabdomen was used. The 5 perforators were traced to the anterior rectus sheath and then into the main pedicle. Then, the medial row perforators were clipped. The main pedicle was then placed into the groin area. After reassurance of adequate perforators and flap perfusion, the recipient site now prepared. The previous radial scar was opened and the implant was removed. Then, pocket  was evaluated and it was excessively large particularly toward the lateral aspect. Therefore, using electrocautery and 0 Vicryl sutures, capsulorrhaphy was performed to the lateral aspect of the subcutaneous pocket. Then, the pectoralis major muscle was split overlying the third intercostal space and then the third rib cartilage was removed gently using a Summit elevator and rongeur. This was done to expose the internal mammary vessels. The internal mammary artery and vein were identified and  and Verapamil solution was placed. Then, the LAY free flap was disconnected from the abdomen using medium clips and flushed with heparinized saline. This was then transferred up to the chest and secured in place. Then, the microsurgical anastomosis was performed starting with the venous anastomosis and using a 2.5 mm venous .   Then, anterior anastomosis was performed using double-apposing Acland clamp and 8-0 interrupted Prolene suture. Good inflow and outflow was assured and then, the flap was placed into the pocket, and a skin paddle was marked laterally. The remaining skin was de-epithelialized and then flap was secured with 0 Vicryl suture in the medial aspect to prevent lateral malposition. Then, a 15 round RICHARDSON suction drain was placed and secured and then, the skin paddle was inset using 3-0 interrupted Vicryl sutures for the deep dermal layer, followed by a 4-0 Monocryl subcuticular sutures for the skin. The abdominal donor site was closed, reapproximating the rectus abdominis muscle fiber and using 2-0 Vicryl sutures and then the anterior rectus sheath was prepared using figure-of-eight Prolene interrupted sutures, oversewn by 0 Vicryl sutures. Then, the patient was placed into a beach chair position and two 15-round RICHARDSON suction drains were placed. Then, the abdominal flap was closed using 0 Vicryl sutures for the Babatunde fascia, followed by 3-0 Vicryl sutures for the deep dermis, and 4-0 Monocryl subcuticular sutures for the skin. Dermabond and Steri-Strips were applied. Dermabond was applied to the breast incisions. VIOPTIX was applied and Doppler signal was marked. The patient tolerated the procedure well and awoke from anesthesia without difficulty. All sponge, instrument, and needle counts were correct at the end of the case. Dictated By June Gaona.  Bambi Victor MD  d: 07/24/2023 18:42:04  t: 07/24/2023 19:22:00  Britney Villalba [id number]  [facility]/

## 2023-07-25 NOTE — OPERATIVE REPORT
[address]    PATIENT'S NAME: Svitlana Lakhani   ATTENDING PHYSICIAN: Ascencion Collado M.D. OPERATING PHYSICIAN: Ascencion Collado M.D. PATIENT ACCOUNT#:   [de-identified]    LOCATION:  [address] [place] [address]  MEDICAL RECORD #:   [id number]       YOB: 1965  ADMISSION DATE:       07/24/2023      OPERATION DATE:  07/24/2023    OPERATIVE REPORT      PREOPERATIVE DIAGNOSIS:    History of left mastectomy and implant reconstruction. POSTOPERATIVE DIAGNOSIS:  History of left mastectomy and implant reconstruction. PROCEDURE:    1. Removal of left breast implant. 2.   Delayed left breast reconstruction with LAY flap. 3.   Primary repair of umbilical hernia. ASSISTANT SURGEON:  Olimpia Ruggiero MD    ASSISTANT:  JARRET Crouch      ANESTHESIA:  General.    ESTIMATED BLOOD LOSS:  50 mL. COMPLICATIONS:  None. INDICATIONS:  The patient is a 70-year-old female with a history of bilateral mastectomy and implant reconstruction. She had a history of left breast conservation therapy prior to this. The patient was referred after developing wound healing difficulties with the left breast requiring debridement and downsizing of her implant. Given the healing issues and her history of radiation, we discussed the option of autologous reconstruction and the patient wished to proceed with abdominal free flap reconstruction. Informed consent was obtained from the patient. The risks, benefits, and alternatives reviewed with the patient preoperatively. She expressed understanding and wished to proceed. FINDINGS:  Left breast reconstructed with right hemiabdominal 4 lateral row  LAY flap with hand-sewn anastomosis to the left internal mammary artery with 8-0 Prolene suture and venous anastomosis with a 2.5 mm . OPERATIVE TECHNIQUE:  The patient was marked in the preoperative holding area in the upright position. The midline and inframammary folds were marked.   Significant inferolateral displacement of the left breast implant was noted and a proposed capsulorrhaphy was marked. The desired abdominal free flap was then created in the usual fashion. The patient was then taken to the operating room, properly identified, placed in the supine position. Sequential compression devices were placed on bilateral lower extremities. Intravenous antibiotic prophylaxis was administered. The patient then underwent successful induction of general anesthesia and endotracheal intubation. The arms were placed abducted on foam-padded cradles and loosely secured with Kerlix. A Villalobos catheter was placed in the usual sterile fashion. A rectal aspirin suppository was given as were 5000 units subcutaneous heparin. Of note, reconstruction of the right breast was performed by Dr. Deneen Bonds and this will be dictated in a separate note. The left breast was reconstructed in the following fashion:  The scar was excised and sent for permanent pathologic analysis. A capsulotomy was then created. An intact implant was then retrieved. The capsule was released superolaterally into the pre-made markings. Next, given the patient's previous abdominal liposuction, attention was turned to the abdomen to confirm the possibility of abdominal free flap reconstruction. The inferior incisions were created with a scalpel. The superficial and inferior epigastric veins identified, dissected for several centimeters, clipped and divided. The flap was then divided in the midline and the umbilicus released with a #11 scalpel. The superior incision was then created with a scalpel deep in the fascia with electrocautery. A superior subcutaneous tunnel was elevated with separate process. The medial and lateral row perforators were exposed. Scarring along the fascia was noted from the previous liposuction. There were 4 lateral perforators which were in line and were chosen to carry the flap.   The fascia surrounding the perforators was incised with tenotomy scissors. The perforators were dissected from the umbilical musculature using bipolar cautery. Larger branches were clipped and divided. Dissection proceeded to the takeoff of the medial row which was clipped and divided. The dissection proceeded to the origin of the vessels where there were 2 veins, 1 large and 1 diminutive, and an artery were circumferentially dissected. The flap was inspected, noted to have normal perfusion. The flap was temporarily stapled in place. Attention was turned to the breast.  The pectoralis muscle overlying the third costal cartilage was incised. The perichondrium was scored with electrocautery and circumferentially freed with a [place] elevator. The costal cartilage was then excised with a rongeur and the posterior perichondrium dissected with bipolar cautery revealing a single left internal mammary artery and vein, which were circumferentially dissected. At this time, the flap vessels were clipped and divided and the flap was transferred to the left chest wall and secured. The left internal mammary vessels were clamped proximally, clipped distally, and divided. The flap and recipient vessels were irrigated with heparinized saline irrigation. They were dissected of any redundant adventitial tissue using the operating microscope, the venous anastomosis performed with a 2.5 mm  in the usual fashion. The arterial anastomosis performed with interrupted 8-0 Prolene suture. The secondary vein was clipped. Venous and arterial control released. The anastomoses were noted to be hemostatic, and the flap was noted to have normal perfusion. It was placed in the chest wall pocket and secured. Once both flaps were transferred, the abdominal wounds were repaired. The myotomies were closed with 2-0 Vicryl figure-of-eight sutures, the fasciotomies with #1 Prolene figure-of-eight suture and then a running 0 Vicryl suture.   At the superior border of the umbilicus, an approximately 5 mm hernia was noted with incarcerated preperitoneal fat. The hernia sac was incised and the preperitoneal fat was excised with electrocautery. The defect was repaired with a single 0 Ethibond figure-of-eight suture. Next, attention was turned superiorly where an approximately 4 cm rectus diastasis was identified. It was repaired with 0 Ethibond figure-of-eight sutures. The abdominal wounds were then irrigated. Hemostasis secured with electrocautery. Two 15-[place] Chase drains were exited through a lateral stab incision and sutured to the skin with 2-0 nylon suture. The bed was then flexed and the abdominal wound repaired in layered fashion with 0 Vicryl suture on Babatunde fascia, 3-0 Vicryl deep dermal suture, and running 4-0 Monocryl subcuticular suture. The umbilicus was then inset. A very large ellipse was excised with a scalpel. Subcutaneous fat was excised with electrocautery. The umbilicus was then retrieved, trimmed with iris scissors, and inset with interrupted 3-0 Vicryl deep dermal sutures and interrupted 5-0 Prolene mattress sutures. With the patient in the upright position, the flaps were tailor tacked and de-epithelialized. A 15-[place] Chase drain was exited through a lateral stab incision and sutured to the skin with 3-0 nylon suture. The flap was then inset with 3-0 Vicryl deep dermal suture and 4-0 Monocryl subcuticular suture. The flaps continued to have normal perfusion with an audible cutaneous Doppler signal and normal capillary refill. A ViOptix probe was placed, which showed adequate readings. Dermabond was placed on all the incisions. Bacitracin, Xeroform, gauze, Tegaderm were placed on the umbilicus. Biopatch and Tegaderm were placed on the drain sites. The procedure was then turned over the anesthesia team for an abdominal block. The patient was then awakened, extubated, taken to intensive care in stable condition.   There were no operative complications. All needle, sponge, and instrument counts were correct at the end of the procedure. Dictated By Tabatha Navarro M.D.  d: 07/24/2023 17:39:40  t: 07/24/2023 19:08:01  [facility] [id number]  LGZ/

## 2023-07-25 NOTE — PLAN OF CARE
Assumed care of patient from OR. Patient alert and oriented x4. RA. SR on monitor. BP WNL. Abdomen soft. Transverse incision C/D/I. Umbilical incision C/D/I. Villalobos in place with clear ellow urine. PRNs given for muscle spasms. IV fluids infusing. PRN given for nausea. RICHARDSON x4 with serosanguinous, bloody output. BL Breast flap dopplers per protocol- see flowsheets. Family updated on POC. See flowsheet for full asssesment.

## 2023-07-25 NOTE — PLAN OF CARE
Assumed care of patient after RN report. Patient alert and oriented x4. On RA. SR on monitor. Tolerating PO and sitting up in bed. Villalobos in place with clear yellow urine out. Pain meds given see MAR. Dopplers Q1 per protocol. Left breast with increased bleeding around site and clots in RICHARDSON drain. Surgical PA notified and at bedside to assess. Other flap assessment WNL see flowsheets. Transverse and umbilical dressing C/D/I. RICHARDSON x4 with bloody/ serosanguinous output. See flowsheet for full assessment.

## 2023-07-25 NOTE — PROGRESS NOTES
Pt was seen again today for some bleeding on flap edge and some increased bloody drainage in the RICHARDSON drain L breast.  Pt was evaluated several times, flap is viable and has good signal and viotix is 91%. The bleeding has stopped and there is no signs of hematoma or other urgent surgical issue now. Pt can resume diet and we will continue to monitor.

## 2023-07-26 LAB
ALBUMIN SERPL-MCNC: 2.5 G/DL (ref 3.4–5)
ALBUMIN/GLOB SERPL: 0.9 {RATIO} (ref 1–2)
ALP LIVER SERPL-CCNC: 113 U/L
ALT SERPL-CCNC: 312 U/L
ANION GAP SERPL CALC-SCNC: 2 MMOL/L (ref 0–18)
AST SERPL-CCNC: 420 U/L (ref 15–37)
BASOPHILS # BLD AUTO: 0.07 X10(3) UL (ref 0–0.2)
BASOPHILS NFR BLD AUTO: 1 %
BILIRUB DIRECT SERPL-MCNC: 0.5 MG/DL (ref 0–0.2)
BILIRUB SERPL-MCNC: 0.9 MG/DL (ref 0.1–2)
BUN BLD-MCNC: 10 MG/DL (ref 7–18)
CALCIUM BLD-MCNC: 8.7 MG/DL (ref 8.5–10.1)
CHLORIDE SERPL-SCNC: 110 MMOL/L (ref 98–112)
CO2 SERPL-SCNC: 27 MMOL/L (ref 21–32)
CREAT BLD-MCNC: 0.58 MG/DL
EGFRCR SERPLBLD CKD-EPI 2021: 104 ML/MIN/1.73M2 (ref 60–?)
EOSINOPHIL # BLD AUTO: 0.25 X10(3) UL (ref 0–0.7)
EOSINOPHIL NFR BLD AUTO: 3.4 %
ERYTHROCYTE [DISTWIDTH] IN BLOOD BY AUTOMATED COUNT: 14.6 %
GLOBULIN PLAS-MCNC: 2.9 G/DL (ref 2.8–4.4)
GLUCOSE BLD-MCNC: 101 MG/DL (ref 70–99)
HCT VFR BLD AUTO: 36.6 %
HGB BLD-MCNC: 11.4 G/DL
IMM GRANULOCYTES # BLD AUTO: 0.05 X10(3) UL (ref 0–1)
IMM GRANULOCYTES NFR BLD: 0.7 %
LYMPHOCYTES # BLD AUTO: 0.72 X10(3) UL (ref 1–4)
LYMPHOCYTES NFR BLD AUTO: 9.8 %
MCH RBC QN AUTO: 27.8 PG (ref 26–34)
MCHC RBC AUTO-ENTMCNC: 31.1 G/DL (ref 31–37)
MCV RBC AUTO: 89.3 FL
MONOCYTES # BLD AUTO: 0.77 X10(3) UL (ref 0.1–1)
MONOCYTES NFR BLD AUTO: 10.5 %
NEUTROPHILS # BLD AUTO: 5.5 X10 (3) UL (ref 1.5–7.7)
NEUTROPHILS # BLD AUTO: 5.5 X10(3) UL (ref 1.5–7.7)
NEUTROPHILS NFR BLD AUTO: 74.6 %
OSMOLALITY SERPL CALC.SUM OF ELEC: 287 MOSM/KG (ref 275–295)
PLATELET # BLD AUTO: 170 10(3)UL (ref 150–450)
POTASSIUM SERPL-SCNC: 3.8 MMOL/L (ref 3.5–5.1)
PROT SERPL-MCNC: 5.4 G/DL (ref 6.4–8.2)
RBC # BLD AUTO: 4.1 X10(6)UL
SODIUM SERPL-SCNC: 139 MMOL/L (ref 136–145)
WBC # BLD AUTO: 7.4 X10(3) UL (ref 4–11)

## 2023-07-26 PROCEDURE — 99233 SBSQ HOSP IP/OBS HIGH 50: CPT | Performed by: INTERNAL MEDICINE

## 2023-07-26 NOTE — PLAN OF CARE
Received patient after report. Patient resting in bed on room air. Pain localized to abdominal surgical site. Doppler checks q2h. Villalobos discontinued. Up to chair with minimal reported pain by patient. Tolerating meals. Supplements encouraged. JPx4 to bulb suction. See flowsheets. Up to bathroom. + gas. Glasses on at bedside. Discussed with ccAPN, holding tylenol doses given LFTs. Order for abdominal US. Family visiting at bedside. Updated on plan of care.

## 2023-07-26 NOTE — PLAN OF CARE
Pt received on room air. IS at bedside. Denies shortness of breath. Left lower abdominal pain- prn meds given as ordered. Pt tolerated sitting on side of bed/ standing at side of bed this evening. No changes noted to flap sites. Vioptix in place. Dopplers q1.    Villalobos intact-

## 2023-07-27 ENCOUNTER — APPOINTMENT (OUTPATIENT)
Dept: ULTRASOUND IMAGING | Facility: HOSPITAL | Age: 58
DRG: 580 | End: 2023-07-27
Attending: NURSE PRACTITIONER
Payer: COMMERCIAL

## 2023-07-27 VITALS
DIASTOLIC BLOOD PRESSURE: 63 MMHG | BODY MASS INDEX: 23.1 KG/M2 | TEMPERATURE: 100 F | SYSTOLIC BLOOD PRESSURE: 116 MMHG | RESPIRATION RATE: 13 BRPM | OXYGEN SATURATION: 98 % | WEIGHT: 142 LBS | HEIGHT: 65.75 IN | HEART RATE: 71 BPM

## 2023-07-27 LAB
ALBUMIN SERPL-MCNC: 2.4 G/DL (ref 3.4–5)
ALBUMIN/GLOB SERPL: 0.8 {RATIO} (ref 1–2)
ALP LIVER SERPL-CCNC: 150 U/L
ALT SERPL-CCNC: 271 U/L
ANION GAP SERPL CALC-SCNC: 3 MMOL/L (ref 0–18)
AST SERPL-CCNC: 309 U/L (ref 15–37)
BILIRUB SERPL-MCNC: 0.5 MG/DL (ref 0.1–2)
BUN BLD-MCNC: 9 MG/DL (ref 7–18)
CALCIUM BLD-MCNC: 8.5 MG/DL (ref 8.5–10.1)
CHLORIDE SERPL-SCNC: 108 MMOL/L (ref 98–112)
CO2 SERPL-SCNC: 27 MMOL/L (ref 21–32)
CREAT BLD-MCNC: 0.56 MG/DL
EGFRCR SERPLBLD CKD-EPI 2021: 105 ML/MIN/1.73M2 (ref 60–?)
ERYTHROCYTE [DISTWIDTH] IN BLOOD BY AUTOMATED COUNT: 14.3 %
GLOBULIN PLAS-MCNC: 3.1 G/DL (ref 2.8–4.4)
GLUCOSE BLD-MCNC: 99 MG/DL (ref 70–99)
HCT VFR BLD AUTO: 31.9 %
HGB BLD-MCNC: 10.5 G/DL
MCH RBC QN AUTO: 28 PG (ref 26–34)
MCHC RBC AUTO-ENTMCNC: 32.9 G/DL (ref 31–37)
MCV RBC AUTO: 85.1 FL
OSMOLALITY SERPL CALC.SUM OF ELEC: 285 MOSM/KG (ref 275–295)
PLATELET # BLD AUTO: 178 10(3)UL (ref 150–450)
POTASSIUM SERPL-SCNC: 3.8 MMOL/L (ref 3.5–5.1)
PROT SERPL-MCNC: 5.5 G/DL (ref 6.4–8.2)
RBC # BLD AUTO: 3.75 X10(6)UL
SODIUM SERPL-SCNC: 138 MMOL/L (ref 136–145)
WBC # BLD AUTO: 6.5 X10(3) UL (ref 4–11)

## 2023-07-27 PROCEDURE — 76700 US EXAM ABDOM COMPLETE: CPT | Performed by: NURSE PRACTITIONER

## 2023-07-27 PROCEDURE — 99232 SBSQ HOSP IP/OBS MODERATE 35: CPT | Performed by: INTERNAL MEDICINE

## 2023-07-27 NOTE — PLAN OF CARE
Received patient after report. Patient resting in bed on room air. Up to chair. Ambulates in cohen without complication. Doppler checks q3h. Discharge education completed. Return demonstration of drain care. Abdominal US completed. See results. Discharged home with  without complication. All belongings sent with patient.

## 2023-07-27 NOTE — PLAN OF CARE
Received patient following report. Patient alert and oriented. All vitals stable. Pain noted to lower abdomen, PRN oxycodone given X2 with good relief. Q2 hour doppler. 4 RICHARDSON drains intact. Patient up walking in halls and voiding in bathroom. Patient NPO at midnight for abd US. See flowsheets and MAR.

## 2023-07-28 NOTE — DISCHARGE SUMMARY
Admission date: 7/24/2023  Surgery date:7/24/2023  Discharge date: 7/27/2023      The patient is a 59year-old female with a history of left mastectomy and implant reconstruction. The patient was admitted to the hospital and taken to the operating room on 7/24/2023 for removal of left breast implant, delayed left breast reconstruction with LAY flap, primary repair of umbilical hernia. The patient was admitted post-operatively for pain management and flap monitoring. The patient's flaps remained well-perfused throughout the hospitalization. By post-operative day #3 (7/27/2023), the patient was ambulating, tolerating oral intake, and voiding spontaneously. She was discharged home on Norco, colace, and EC Aspirin in addition to her usual pre-operative medications.

## 2023-08-03 ENCOUNTER — NURSE ONLY (OUTPATIENT)
Dept: SURGERY | Facility: CLINIC | Age: 58
End: 2023-08-03
Payer: COMMERCIAL

## 2023-08-03 PROCEDURE — 99024 POSTOP FOLLOW-UP VISIT: CPT | Performed by: SURGERY

## 2023-08-03 NOTE — PROGRESS NOTES
Miladis Azevedo is a 59year old female who presents today for a follow-up after s/p bilateral breast reconstruction with LAY free flap, bilateral breast implant removal, right breast capsulorrhaphy, primary repair of umbilical hernia on 29/10/0306 by Dr. Nick Garner. She denies fever and chills. She denies nausea, vomiting, diarrhea or constipation. Her pain is controlled, she is not currently taking any pain medication due to elevated liver enzymes. She was instructed to call her PCP to advise if she can resume tylenol. Physical Exam     Surgical incisions are clean, dry, and intact. No erythema, no wound drainage. Breasts: Bilateral breast flaps soft and viable. Left breast with mild resolving ecchymosis. Bilateral breast drains in place with serosanguinous drainage. Abdomen: Soft and non-tender. Umbilical viable. No signs of erythema, ecchymosis or hematoma. Bilateral abdominal drains intact with serosanguinous drainage. Steri-strips in place. There were no vitals filed for this visit. Assessment and Plan     Miladis Azevedo is doing well s/p bilateral breast reconstruction with LAY free flap, bilateral breast implant removal, right breast capsulorrhaphy, primary repair of umbilical hernia on 35/47/0948 by Dr. Nick Garner. Bilateral breast prolene sutures were removed. Patient tolerated this well. Right breast drain removed due to low output. Patient tolerated this well. New drain site dressing of neosporin, gauze and Tegaderm was placed. Patient and her  were instructed to change this if wet or soiled. Right abdominal drain removed due to low output. Patient tolerated this well. New drain site dressing of neosporin, gauze and tegaderm was placed. Patient and her  were instructed to change this if wet or soiled. Left breast drain remains in place due to ecchymosis and incisional scabbing. A new drain dressing of Biopatch and Tegaderm was placed. Patient and her  were instructed to change this dressing if wet or soiled. Left abdominal drain was left in place due to high output. A new drain dressing of biopatch and tegaderm was placed. Patient and her  were instructed to change this dressing if wet or soiled. Abdominal steri-strips removed. Central aspect of the abdominal incision with superficial wound healing delay. Neosporin and gauze placed. Patient will change this daily. New steri-strips were placed the the remainder of the abdominal incision. Umbilicus sutures removed. Patient tolerated this well. Neosporin and xeroform placed. Dressing was covered with Telfa and secured with paper tape. We discussed continued activity restrictions. She will follow up with RN or PA/NP in 1 week for wound check and final drain check (remaining left breast and left abdomen drains) and will follow up with Dr. Nawaf Yanes on 8/18/23. Questions were answered. Patient understands.      Vida Gallegos RN  8/3/2023  9:43 AM

## 2023-08-05 RX ORDER — CEPHALEXIN 500 MG/1
500 CAPSULE ORAL 4 TIMES DAILY
Qty: 28 CAPSULE | Refills: 0 | Status: SHIPPED | OUTPATIENT
Start: 2023-08-05 | End: 2023-08-12

## 2023-08-05 NOTE — TELEPHONE ENCOUNTER
Spoke to pt. She reports tenderness and drainage from her abdominal drain (removed) site. Reports temp 100.4. Called in Keflex. Pt instructed to call for spreading erythema, fever spikes. Will otherwise f/u in office next week.

## 2023-08-07 ENCOUNTER — OFFICE VISIT (OUTPATIENT)
Dept: SURGERY | Facility: CLINIC | Age: 58
End: 2023-08-07
Payer: COMMERCIAL

## 2023-08-07 DIAGNOSIS — Z90.13 S/P MASTECTOMY, BILATERAL: Primary | ICD-10-CM

## 2023-08-07 PROCEDURE — 99024 POSTOP FOLLOW-UP VISIT: CPT

## 2023-08-07 NOTE — PROGRESS NOTES
Leesa Grubbs is a 59year old female who presents today for a follow-up after  bilateral breast reconstruction with LAY free flap, bilateral breast implant removal, right breast capsulorrhaphy, primary repair of umbilical hernia on 03/34/6991 by Dr. Shade Saleh. She has intermittent fever and chills. She contacted Dr. Shade Saleh on 8/5/2023 due to increased swelling and drainage from her right abdominal incision. She was placed on Keflex per Dr. Shade Saleh. She denies nausea, vomiting, diarrhea or constipation. Her pain is controlled. Physical Exam     Breasts: Bilateral breast incisions are clean, dry, and intact. No evidence of hematoma or seroma. Bilateral breast flaps soft and viable. Left breast with mild ecchymosis. Left breast tender to palpation. Left breast drain clean, dry, and intact with purulent drainage. Abdomen: Soft but tender to palpation. Umbilical viable. Mild erythema centrally. No evidence of hematoma or seroma. Left abdominal drain is clean, dry, and intact with purulent drainage. Right abdominal incision expressing purulent drainage. No evidence of an open wound or necrosis. There were no vitals filed for this visit. Assessment and Plan     Leesa Grubbs is doing well s/p bilateral breast reconstruction with LAY free flap, bilateral breast implant removal, right breast capsulorrhaphy, primary repair of umbilical hernia on 63/62/7489 by Dr. Shade Saleh. Patient is here today for a wound check. The left breast was redressed with Neosporin, Xeroform, Telfa, and Tape. The left breast drain was redressed with a Biopatch and Tegaderm. The umbilicus was redressed with Neosporin, packed Xeroform, and a Telfa. The central abdominal incision was redressed with Steri-Strips. The left abdominal drain was redressed with a Biopatch and Tegaderm. The right aspect of the abdominal incision was redressed with Neosporin, Xeroform, and a Telfa.  Drain parameters were reviewed with the patient. The patient will continue oral antibiotics until she is seen by Dr. Alyssa Musa. She was instructed to use an abdominal binder. Dressing care and instructions were reviewed with the patient and her . Supplies were given. She was instructed to use Mupirocin ointment on her dressings instead of the Neosporin. Nutritional supplementation such as Joe was reviewed with the patient and a coupon was given. She will follow up with Dr. Alyssa Musa on 8/11/2023 for wound check. She was encouraged to call the office with any questions/concerns or if persistent or worsening symptoms. Questions were answered. Patient understands.      RAE Evans  8/7/2023  4:14 PM 13-Jan-2022 05:24

## 2023-08-11 ENCOUNTER — OFFICE VISIT (OUTPATIENT)
Dept: SURGERY | Facility: CLINIC | Age: 58
End: 2023-08-11
Payer: COMMERCIAL

## 2023-08-11 DIAGNOSIS — Z90.13 S/P MASTECTOMY, BILATERAL: ICD-10-CM

## 2023-08-11 DIAGNOSIS — L03.319 CELLULITIS OF TRUNK, UNSPECIFIED SITE OF TRUNK: Primary | ICD-10-CM

## 2023-08-11 PROCEDURE — 99024 POSTOP FOLLOW-UP VISIT: CPT | Performed by: SURGERY

## 2023-08-11 RX ORDER — CEPHALEXIN 500 MG/1
500 CAPSULE ORAL 4 TIMES DAILY
Qty: 20 CAPSULE | Refills: 0 | Status: SHIPPED | OUTPATIENT
Start: 2023-08-11 | End: 2023-08-16

## 2023-08-18 ENCOUNTER — OFFICE VISIT (OUTPATIENT)
Dept: SURGERY | Facility: CLINIC | Age: 58
End: 2023-08-18
Payer: COMMERCIAL

## 2023-08-18 DIAGNOSIS — Z90.13 S/P MASTECTOMY, BILATERAL: Primary | ICD-10-CM

## 2023-08-18 PROCEDURE — 99024 POSTOP FOLLOW-UP VISIT: CPT | Performed by: SURGERY

## 2023-09-29 ENCOUNTER — OFFICE VISIT (OUTPATIENT)
Dept: SURGERY | Facility: CLINIC | Age: 58
End: 2023-09-29
Payer: COMMERCIAL

## 2023-09-29 DIAGNOSIS — Z90.13 S/P MASTECTOMY, BILATERAL: Primary | ICD-10-CM

## 2023-09-29 PROCEDURE — 99024 POSTOP FOLLOW-UP VISIT: CPT | Performed by: SURGERY

## 2023-09-29 NOTE — PATIENT INSTRUCTIONS
Surgeon:         Dr. Leonardo Díaz                                        Tel:         [phone]                                  Fax:        [phone]    Surgery/Procedure: Revision of right reconstructed breast, autologous fat grafting to the left reconstructed breast, outpatient, general anesthesia, 2 hours, has to be done after March 2024 for her left arm to heal fully     Dx Code: Z90.13    Hospital:  [facility]: [address] [place] [address], [place], [address] [facility]           [phone]  [facility] [place] [facility]: [address], [facility]               [phone]    1. Someone will need to drive you to and from the hospital if your procedure is outpatient. 2.Do not drink alcohol or smoke 24 hours prior to your procedure. 3. Bring a picture ID and your insurance card. 4. You will be contacted by the hospital the day before to confirm the procedure time and location. 5. Do not take any herbal supplements or blood thinners at least one week before your procedure/surgery. This includes NSAID's (aspirin, baby aspirin, Motrin, Ibuprofen, Aleve, Advil, Naproxen, etc), Plavix, fish oil, vitamin E, turmeric, CoQ10, or green tea supplements, etc. *TYLENOL or acetaminophen is ok to take*    6. PRE-OPERATIVE TESTING: History and physical with medical clearance is REQUIRED within 30 days of the surgery date and is mandatory per Dr. Mckinney Matters. *If this is not done, your surgery will be postponed*  MEDICAL CLEARANCE WITH DR. Flores  CBC  CMP  EKG    7. Please inform us if you start or change any medications at least one week before surgery (ex: blood thinners, weight loss medications, diabetic medications, herbal supplements, etc)    8. Does patient have diagnosis of sleep apnea?     [   ] Yes     [   X ]  No    Consent obtained  Photos taken on 9/29/2023

## 2023-10-05 ENCOUNTER — TELEPHONE (OUTPATIENT)
Dept: SURGERY | Facility: CLINIC | Age: 58
End: 2023-10-05

## 2023-10-05 DIAGNOSIS — Z90.13 S/P MASTECTOMY, BILATERAL: Primary | ICD-10-CM

## 2023-10-05 NOTE — TELEPHONE ENCOUNTER
Calling pt in regards to scheduling surgery. Informed pt that I have 04/24/2023 available at [facility] with Dr. Hank Figueroa. Pt verbalized understanding and in agreement with date and location. All questions answered. Encouraged pt to call or Web [employer] message office with any other questions or concerns.

## 2024-03-21 ENCOUNTER — APPOINTMENT (OUTPATIENT)
Dept: ADMINISTRATIVE | Facility: HOSPITAL | Age: 59
End: 2024-03-21
Payer: COMMERCIAL

## 2024-03-21 RX ORDER — FOLIC ACID 1 MG/1
50000 TABLET ORAL
COMMUNITY

## 2024-03-21 RX ORDER — FAMOTIDINE 10 MG
10 TABLET ORAL
COMMUNITY

## 2024-03-21 RX ORDER — PHENOL 1.4 %
600 AEROSOL, SPRAY (ML) MUCOUS MEMBRANE 2 TIMES DAILY
COMMUNITY

## 2024-03-21 RX ORDER — DENOSUMAB 60 MG/ML
60 INJECTION SUBCUTANEOUS
COMMUNITY

## 2024-04-18 ENCOUNTER — TELEPHONE (OUTPATIENT)
Dept: SURGERY | Facility: CLINIC | Age: 59
End: 2024-04-18

## 2024-04-24 ENCOUNTER — HOSPITAL ENCOUNTER (OUTPATIENT)
Facility: HOSPITAL | Age: 59
Setting detail: HOSPITAL OUTPATIENT SURGERY
Discharge: HOME | End: 2024-04-24
Attending: SURGERY | Admitting: SURGERY
Payer: COMMERCIAL

## 2024-04-24 ENCOUNTER — ANESTHESIA (OUTPATIENT)
Dept: SURGERY | Facility: HOSPITAL | Age: 59
End: 2024-04-24
Payer: COMMERCIAL

## 2024-04-24 ENCOUNTER — ANESTHESIA EVENT (OUTPATIENT)
Dept: SURGERY | Facility: HOSPITAL | Age: 59
End: 2024-04-24
Payer: COMMERCIAL

## 2024-04-24 VITALS
WEIGHT: 137.38 LBS | HEART RATE: 75 BPM | BODY MASS INDEX: 22.08 KG/M2 | OXYGEN SATURATION: 100 % | TEMPERATURE: 98 F | DIASTOLIC BLOOD PRESSURE: 68 MMHG | SYSTOLIC BLOOD PRESSURE: 138 MMHG | HEIGHT: 66 IN | RESPIRATION RATE: 16 BRPM

## 2024-04-24 DIAGNOSIS — Z90.13 S/P MASTECTOMY, BILATERAL: Primary | ICD-10-CM

## 2024-04-24 PROCEDURE — 0WQFXZZ REPAIR ABDOMINAL WALL, EXTERNAL APPROACH: ICD-10-PCS | Performed by: SURGERY

## 2024-04-24 PROCEDURE — 88342 IMHCHEM/IMCYTCHM 1ST ANTB: CPT | Performed by: SURGERY

## 2024-04-24 PROCEDURE — 0JDM3ZZ EXTRACTION OF LEFT UPPER LEG SUBCUTANEOUS TISSUE AND FASCIA, PERCUTANEOUS APPROACH: ICD-10-PCS | Performed by: SURGERY

## 2024-04-24 PROCEDURE — 0HBT0ZX EXCISION OF RIGHT BREAST, OPEN APPROACH, DIAGNOSTIC: ICD-10-PCS | Performed by: SURGERY

## 2024-04-24 PROCEDURE — 0JDL3ZZ EXTRACTION OF RIGHT UPPER LEG SUBCUTANEOUS TISSUE AND FASCIA, PERCUTANEOUS APPROACH: ICD-10-PCS | Performed by: SURGERY

## 2024-04-24 PROCEDURE — 88305 TISSUE EXAM BY PATHOLOGIST: CPT | Performed by: SURGERY

## 2024-04-24 PROCEDURE — 0JD83ZZ EXTRACTION OF ABDOMEN SUBCUTANEOUS TISSUE AND FASCIA, PERCUTANEOUS APPROACH: ICD-10-PCS | Performed by: SURGERY

## 2024-04-24 PROCEDURE — 0HRV37Z REPLACEMENT OF BILATERAL BREAST WITH AUTOLOGOUS TISSUE SUBSTITUTE, PERCUTANEOUS APPROACH: ICD-10-PCS | Performed by: SURGERY

## 2024-04-24 RX ORDER — LABETALOL HYDROCHLORIDE 5 MG/ML
5 INJECTION, SOLUTION INTRAVENOUS EVERY 5 MIN PRN
Status: DISCONTINUED | OUTPATIENT
Start: 2024-04-24 | End: 2024-04-24

## 2024-04-24 RX ORDER — HYDROMORPHONE HYDROCHLORIDE 1 MG/ML
0.4 INJECTION, SOLUTION INTRAMUSCULAR; INTRAVENOUS; SUBCUTANEOUS EVERY 5 MIN PRN
Status: DISCONTINUED | OUTPATIENT
Start: 2024-04-24 | End: 2024-04-24

## 2024-04-24 RX ORDER — DIPHENHYDRAMINE HYDROCHLORIDE 50 MG/ML
12.5 INJECTION INTRAMUSCULAR; INTRAVENOUS AS NEEDED
Status: DISCONTINUED | OUTPATIENT
Start: 2024-04-24 | End: 2024-04-24

## 2024-04-24 RX ORDER — PHENYLEPHRINE HCL 10 MG/ML
VIAL (ML) INJECTION AS NEEDED
Status: DISCONTINUED | OUTPATIENT
Start: 2024-04-24 | End: 2024-04-24 | Stop reason: SURG

## 2024-04-24 RX ORDER — ONDANSETRON 2 MG/ML
INJECTION INTRAMUSCULAR; INTRAVENOUS AS NEEDED
Status: DISCONTINUED | OUTPATIENT
Start: 2024-04-24 | End: 2024-04-24 | Stop reason: SURG

## 2024-04-24 RX ORDER — GLYCOPYRROLATE 0.2 MG/ML
INJECTION, SOLUTION INTRAMUSCULAR; INTRAVENOUS AS NEEDED
Status: DISCONTINUED | OUTPATIENT
Start: 2024-04-24 | End: 2024-04-24 | Stop reason: SURG

## 2024-04-24 RX ORDER — ONDANSETRON 4 MG/1
4 TABLET, FILM COATED ORAL EVERY 8 HOURS PRN
Qty: 12 TABLET | Refills: 0 | Status: SHIPPED | OUTPATIENT
Start: 2024-04-24

## 2024-04-24 RX ORDER — SCOLOPAMINE TRANSDERMAL SYSTEM 1 MG/1
1 PATCH, EXTENDED RELEASE TRANSDERMAL ONCE
Status: DISCONTINUED | OUTPATIENT
Start: 2024-04-24 | End: 2024-04-24 | Stop reason: HOSPADM

## 2024-04-24 RX ORDER — MEPERIDINE HYDROCHLORIDE 25 MG/ML
12.5 INJECTION INTRAMUSCULAR; INTRAVENOUS; SUBCUTANEOUS AS NEEDED
Status: DISCONTINUED | OUTPATIENT
Start: 2024-04-24 | End: 2024-04-24

## 2024-04-24 RX ORDER — HYDROMORPHONE HYDROCHLORIDE 1 MG/ML
0.6 INJECTION, SOLUTION INTRAMUSCULAR; INTRAVENOUS; SUBCUTANEOUS EVERY 5 MIN PRN
Status: DISCONTINUED | OUTPATIENT
Start: 2024-04-24 | End: 2024-04-24

## 2024-04-24 RX ORDER — NEOSTIGMINE METHYLSULFATE 1 MG/ML
INJECTION, SOLUTION INTRAVENOUS AS NEEDED
Status: DISCONTINUED | OUTPATIENT
Start: 2024-04-24 | End: 2024-04-24 | Stop reason: SURG

## 2024-04-24 RX ORDER — ONDANSETRON 2 MG/ML
INJECTION INTRAMUSCULAR; INTRAVENOUS
Status: COMPLETED
Start: 2024-04-24 | End: 2024-04-24

## 2024-04-24 RX ORDER — SODIUM CHLORIDE, SODIUM LACTATE, POTASSIUM CHLORIDE, CALCIUM CHLORIDE 600; 310; 30; 20 MG/100ML; MG/100ML; MG/100ML; MG/100ML
INJECTION, SOLUTION INTRAVENOUS CONTINUOUS
Status: DISCONTINUED | OUTPATIENT
Start: 2024-04-24 | End: 2024-04-24

## 2024-04-24 RX ORDER — ACETAMINOPHEN 500 MG
1000 TABLET ORAL ONCE AS NEEDED
Status: DISCONTINUED | OUTPATIENT
Start: 2024-04-24 | End: 2024-04-24

## 2024-04-24 RX ORDER — LIDOCAINE HYDROCHLORIDE AND EPINEPHRINE 10; 10 MG/ML; UG/ML
INJECTION, SOLUTION INFILTRATION; PERINEURAL AS NEEDED
Status: DISCONTINUED | OUTPATIENT
Start: 2024-04-24 | End: 2024-04-24 | Stop reason: HOSPADM

## 2024-04-24 RX ORDER — HYDROMORPHONE HYDROCHLORIDE 1 MG/ML
0.2 INJECTION, SOLUTION INTRAMUSCULAR; INTRAVENOUS; SUBCUTANEOUS EVERY 5 MIN PRN
Status: DISCONTINUED | OUTPATIENT
Start: 2024-04-24 | End: 2024-04-24

## 2024-04-24 RX ORDER — SCOLOPAMINE TRANSDERMAL SYSTEM 1 MG/1
PATCH, EXTENDED RELEASE TRANSDERMAL
Status: COMPLETED
Start: 2024-04-24 | End: 2024-04-27

## 2024-04-24 RX ORDER — HYDROCODONE BITARTRATE AND ACETAMINOPHEN 5; 325 MG/1; MG/1
2 TABLET ORAL ONCE AS NEEDED
Status: DISCONTINUED | OUTPATIENT
Start: 2024-04-24 | End: 2024-04-24

## 2024-04-24 RX ORDER — DOCUSATE SODIUM 100 MG/1
100 CAPSULE, LIQUID FILLED ORAL 2 TIMES DAILY
Qty: 30 CAPSULE | Refills: 1 | Status: SHIPPED | OUTPATIENT
Start: 2024-04-24

## 2024-04-24 RX ORDER — HYDROCODONE BITARTRATE AND ACETAMINOPHEN 5; 325 MG/1; MG/1
1-2 TABLET ORAL EVERY 4 HOURS PRN
Qty: 20 TABLET | Refills: 0 | Status: SHIPPED | OUTPATIENT
Start: 2024-04-24

## 2024-04-24 RX ORDER — MIDAZOLAM HYDROCHLORIDE 1 MG/ML
INJECTION INTRAMUSCULAR; INTRAVENOUS AS NEEDED
Status: DISCONTINUED | OUTPATIENT
Start: 2024-04-24 | End: 2024-04-24 | Stop reason: SURG

## 2024-04-24 RX ORDER — NALOXONE HYDROCHLORIDE 0.4 MG/ML
0.08 INJECTION, SOLUTION INTRAMUSCULAR; INTRAVENOUS; SUBCUTANEOUS AS NEEDED
Status: DISCONTINUED | OUTPATIENT
Start: 2024-04-24 | End: 2024-04-24

## 2024-04-24 RX ORDER — SCOLOPAMINE TRANSDERMAL SYSTEM 1 MG/1
1 PATCH, EXTENDED RELEASE TRANSDERMAL
Status: DISCONTINUED | OUTPATIENT
Start: 2024-04-24 | End: 2024-04-24

## 2024-04-24 RX ORDER — CEFAZOLIN SODIUM/WATER 2 G/20 ML
2 SYRINGE (ML) INTRAVENOUS ONCE
Status: COMPLETED | OUTPATIENT
Start: 2024-04-24 | End: 2024-04-24

## 2024-04-24 RX ORDER — ACETAMINOPHEN 500 MG
1000 TABLET ORAL ONCE
Status: DISCONTINUED | OUTPATIENT
Start: 2024-04-24 | End: 2024-04-24 | Stop reason: HOSPADM

## 2024-04-24 RX ORDER — DEXAMETHASONE SODIUM PHOSPHATE 4 MG/ML
VIAL (ML) INJECTION AS NEEDED
Status: DISCONTINUED | OUTPATIENT
Start: 2024-04-24 | End: 2024-04-24 | Stop reason: SURG

## 2024-04-24 RX ORDER — HYDROCODONE BITARTRATE AND ACETAMINOPHEN 5; 325 MG/1; MG/1
1 TABLET ORAL ONCE AS NEEDED
Status: DISCONTINUED | OUTPATIENT
Start: 2024-04-24 | End: 2024-04-24

## 2024-04-24 RX ORDER — ROCURONIUM BROMIDE 10 MG/ML
INJECTION, SOLUTION INTRAVENOUS AS NEEDED
Status: DISCONTINUED | OUTPATIENT
Start: 2024-04-24 | End: 2024-04-24 | Stop reason: SURG

## 2024-04-24 RX ORDER — PROCHLORPERAZINE EDISYLATE 5 MG/ML
5 INJECTION INTRAMUSCULAR; INTRAVENOUS EVERY 8 HOURS PRN
Status: DISCONTINUED | OUTPATIENT
Start: 2024-04-24 | End: 2024-04-24

## 2024-04-24 RX ORDER — ONDANSETRON 2 MG/ML
4 INJECTION INTRAMUSCULAR; INTRAVENOUS EVERY 6 HOURS PRN
Status: DISCONTINUED | OUTPATIENT
Start: 2024-04-24 | End: 2024-04-24

## 2024-04-24 RX ORDER — LIDOCAINE HYDROCHLORIDE 10 MG/ML
INJECTION, SOLUTION EPIDURAL; INFILTRATION; INTRACAUDAL; PERINEURAL AS NEEDED
Status: DISCONTINUED | OUTPATIENT
Start: 2024-04-24 | End: 2024-04-24 | Stop reason: SURG

## 2024-04-24 RX ORDER — EPHEDRINE SULFATE 50 MG/ML
INJECTION INTRAVENOUS AS NEEDED
Status: DISCONTINUED | OUTPATIENT
Start: 2024-04-24 | End: 2024-04-24 | Stop reason: SURG

## 2024-04-24 RX ADMIN — GLYCOPYRROLATE 0.8 MG: 0.2 INJECTION, SOLUTION INTRAMUSCULAR; INTRAVENOUS at 18:11:00

## 2024-04-24 RX ADMIN — DEXAMETHASONE SODIUM PHOSPHATE 4 MG: 4 MG/ML VIAL (ML) INJECTION at 16:31:00

## 2024-04-24 RX ADMIN — PHENYLEPHRINE HCL 100 MCG: 10 MG/ML VIAL (ML) INJECTION at 17:02:00

## 2024-04-24 RX ADMIN — ROCURONIUM BROMIDE 35 MG: 10 INJECTION, SOLUTION INTRAVENOUS at 16:21:00

## 2024-04-24 RX ADMIN — CEFAZOLIN SODIUM/WATER 2 G: 2 G/20 ML SYRINGE (ML) INTRAVENOUS at 16:26:00

## 2024-04-24 RX ADMIN — ONDANSETRON 4 MG: 2 INJECTION INTRAMUSCULAR; INTRAVENOUS at 17:51:00

## 2024-04-24 RX ADMIN — MIDAZOLAM HYDROCHLORIDE 2 MG: 1 INJECTION INTRAMUSCULAR; INTRAVENOUS at 16:12:00

## 2024-04-24 RX ADMIN — LIDOCAINE HYDROCHLORIDE 25 MG: 10 INJECTION, SOLUTION EPIDURAL; INFILTRATION; INTRACAUDAL; PERINEURAL at 16:21:00

## 2024-04-24 RX ADMIN — NEOSTIGMINE METHYLSULFATE 4 MG: 1 INJECTION, SOLUTION INTRAVENOUS at 18:11:00

## 2024-04-24 RX ADMIN — PHENYLEPHRINE HCL 100 MCG: 10 MG/ML VIAL (ML) INJECTION at 16:55:00

## 2024-04-24 RX ADMIN — SODIUM CHLORIDE, SODIUM LACTATE, POTASSIUM CHLORIDE, CALCIUM CHLORIDE: 600; 310; 30; 20 INJECTION, SOLUTION INTRAVENOUS at 16:12:00

## 2024-04-24 RX ADMIN — SODIUM CHLORIDE, SODIUM LACTATE, POTASSIUM CHLORIDE, CALCIUM CHLORIDE: 600; 310; 30; 20 INJECTION, SOLUTION INTRAVENOUS at 18:07:00

## 2024-04-24 RX ADMIN — EPHEDRINE SULFATE 10 MG: 50 INJECTION INTRAVENOUS at 17:12:00

## 2024-04-24 RX ADMIN — PHENYLEPHRINE HCL 100 MCG: 10 MG/ML VIAL (ML) INJECTION at 17:06:00

## 2024-04-24 NOTE — H&P
No change in Dr. Flores's H&P from 4/16. We reviewed the plan for fat grafting to the left reconstructed breast, revision of the right breast  and revision of the abdominal scar. The nature of the procedure was reviewed with the patient. We discussed the risk of surgery including but not limited bleeding, infection with scarring, delayed wound healing, asymmetry, injury to intra-abdominal structures, cysts or calcifications requiring biopsy, and need for further surgery. We discussed the expected postoperative course including need for activity limitation, compression, and possibly drains. Multiple questions were answered to the patient and  satisfaction. No guarantees as to outcome were offered. The patient expresses understanding and wishes to proceed.

## 2024-04-24 NOTE — ANESTHESIA PREPROCEDURE EVALUATION
PRE-OP EVALUATION    Patient Name: Chloe Bennett    Admit Diagnosis: S/P mastectomy, bilateral [Z90.13]    Pre-op Diagnosis: S/P mastectomy, bilateral [Z90.13]    Revision of right reconstructed breast, autologous fat grafting to the left reconstructed breast    Anesthesia Procedure: Revision of right reconstructed breast, autologous fat grafting to the left reconstructed breast (Bilateral)  . (Bilateral)    Surgeons and Role:     * Ascencion Ashraf MD - Primary    Pre-op vitals reviewed.        Body mass index is 22.44 kg/m².    Current medications reviewed.  Hospital Medications:   EPINEPHrine (Adrenalin) 1 mg, lidocaine (Xylocaine) 1 % 50 mL in lactated ringers 1,000 mL tumescent mixture/irrigation   Infiltration Once (Intra-Op)    ceFAZolin (Ancef) 1 g, gentamicin (Garamycin) 80 mg in sodium chloride 0.9% 1,000 mL irrigation   Irrigation Once (Intra-Op)       Outpatient Medications:     No medications prior to admission.       Allergies: Patient has no known allergies.      Anesthesia Evaluation    Patient summary reviewed.    Anesthetic Complications  (+) history of anesthetic complications  History of: PONV       GI/Hepatic/Renal      (+) GERD and well controlled                          Cardiovascular    Negative cardiovascular ROS.  ECG reviewed.            (+) hypertension                                     Endo/Other           (+) hypothyroidism                       Pulmonary    Negative pulmonary ROS.                       Neuro/Psych      (+) depression  (+) anxiety                      Crest syndrome-raynauds phenomenon, esophageal dysmobility(asymptomatic)  Gout  Breast Cancer-left  Neurofibromatosis        Past Surgical History:   Procedure Laterality Date    Benign biopsy left      Benign biopsy right      Breast biopsy  2019    Breast lumpectomy  2019    Breast reconstruction  08/2022    and revision 9/2022    Cholecystectomy      Colonoscopy      Colonoscopy      Egd  2020    Endometrial  ablation N/A 2006    Insert tissue expander(s)  11/2022    Laparoscopic cholecystectomy N/A 2009    Mastectomy left      Mastectomy right      Mastectomy,simple Bilateral 09/2022    Other surgical history       Social History     Socioeconomic History    Marital status:    Tobacco Use    Smoking status: Never    Smokeless tobacco: Never   Vaping Use    Vaping status: Never Used   Substance and Sexual Activity    Alcohol use: Not Currently     Comment: ONCE A MONTH    Drug use: Never     History   Drug Use Unknown     Available pre-op labs reviewed.               Airway      Mallampati: II  Mouth opening: >3 FB  TM distance: 4 - 6 cm  Neck ROM: full Cardiovascular      Rhythm: regular  Rate: normal     Dental             Pulmonary    Pulmonary exam normal.                 Other findings  Upper permanent bridge            ASA: 3   Plan: general  NPO status verified and           Plan/risks discussed with: patient and spouse                Present on Admission:  **None**

## 2024-04-24 NOTE — ANESTHESIA POSTPROCEDURE EVALUATION
[facility]    Chloe Bennett Patient Status:  Hospital Outpatient Surgery   Age/Gender 58 year old female MRN [id number]   Location [facility] POST ANESTHESIA CARE UNIT Attending Ascencion Ashraf MD   [facility] # 0 [facility] Alan Flores       Anesthesia Post-op Note    Revision of right reconstructed breast, autologous fat grafting to the left reconstructed breast, Revision of abdominal scar    Procedure Summary       Date: 04/24/24 Room / Location:  MAIN OR [address] [place] [address] MAIN OR    Anesthesia Start: 1612 Anesthesia Stop: 1839    Procedures:       Revision of right reconstructed breast, autologous fat grafting to the left reconstructed breast, Revision of abdominal scar (Bilateral: Breast)      . (Bilateral) Diagnosis:       S/P mastectomy, bilateral      (S/P mastectomy, bilateral [Z90.13])    Surgeons: Ascencion Ashraf MD Anesthesiologist: Gita Henry MD    Anesthesia Type: general ASA Status: 3            Anesthesia Type: general    Vitals Value Taken Time   /82 04/24/24 1835   Temp 97 °F (36.1 °C) 04/24/24 1835   Pulse 87 04/24/24 1839   Resp 15 04/24/24 1839   SpO2 100 % 04/24/24 1839   Vitals shown include unfiled device data.    Patient Location: PACU    Anesthesia Type: general    Airway Patency: patent and extubated    Postop Pain Control: adequate    Mental Status: preanesthetic baseline    Nausea/Vomiting: none    Cardiopulmonary/Hydration status: stable euvolemic    Complications: no apparent anesthesia related complications    Postop vital signs: stable    Dental Exam: Unchanged from Preop    Patient to be discharged from PACU when criteria met.

## 2024-04-24 NOTE — BRIEF OP NOTE
Pre-Operative Diagnosis: S/P mastectomy, bilateral [Z90.13]     Post-Operative Diagnosis: S/P mastectomy, bilateral [Z90.13]      Procedure Performed:   Revision of reconstructed breast, fat grafting to bilateral reconstructed breast, revision of abdominal scar    Surgeons and Role:     * Ascencion Ashraf MD - Primary    Assistant(s):  APN: Qiana Allred APRN     Surgical Findings: Nl     Specimen: None     Estimated Blood Loss: Blood Output: 10 mL (4/24/2024  6:15 PM)      Ascencion Ashraf MD  4/24/2024  6:22 PM

## 2024-04-24 NOTE — ANESTHESIA PROCEDURE NOTES
Airway  Date/Time: 4/24/2024 4:22 PM  Urgency: elective      General Information and Staff    Patient location during procedure: OR  Anesthesiologist: Gita Henry MD  Performed: anesthesiologist   Performed by: Gita Henry MD  Authorized by: Gita Henry MD      Indications and Patient Condition  Indications for airway management: anesthesia  Spontaneous Ventilation: absent  Sedation level: deep  Preoxygenated: yes  Patient position: sniffing  Mask difficulty assessment: 1 - vent by mask    Final Airway Details  Final airway type: endotracheal airway      Successful airway: ETT  Cuffed: yes   Successful intubation technique: direct laryngoscopy  Endotracheal tube insertion site: oral  Blade: Malgorzata  Blade size: #2  ETT size (mm): 7.0    Cormack-Lehane Classification: grade I - full view of glottis  Placement verified by: capnometry   Cuff volume (mL): 7  Measured from: lips  ETT to lips (cm): 21  Number of attempts at approach: 1  Number of other approaches attempted: 0    Additional Comments  Smooth induction, smooth intubation, no trauma

## 2024-04-24 NOTE — DISCHARGE INSTRUCTIONS
Plastic Surgery Home Care Instructions      We hope you were pleased with your care at [facility].  We wish you the best outcome and overall experience with your operation.  These instructions will help to minimize pain, optimize healing, and improve the likelihood of a successful result.    What To Expect  There will be some spotting of the incision lines for the next few days.  Your breasts will be swollen and might feel congested for the next 1-2 weeks  Temporary areas of numbness are typical in the early weeks following a breast procedure.  Normalization of sensation can typically take up to several months following the operation.  Mild bruising, mild swelling and discomfort in the areas of fat grafting is typical.   Please DO NOT apply heat or ice to the affected area    Bandages (Dressing)  Keep dressings clean and dry  Do not remove your bra unless for hygiene purposes - compression is key - especially at night. You can change to a supportive sports bra or camilsole if this provides good compression and you are more comfortable in that rather than the surgical bra. No underwire.   You are to wear your bra 24/7 for 6 weeks post-operatively.   Reinforce the dressing with insertion of additional padding as needed - this is not required - for your comfort only  Leave white steri-strips in place over incisions.  Wear abdominal binder, foam and ace wraps at all times for at least 3 days after surgery. After this, you can change to your own compression garments if they area more comfortable (such as Spanx etc). Wear some sort of abdominal and thigh compression at all times for at least 7 days after surgery. After 7 days, abdominal and thigh compression is not medically necessary, but compression can continue to help with swelling and prevent contour irregularities when worn for 6 weeks postoperatively.     Bathing/Showers  You can resume showering tomorrow. Let the soap and water run over incisions, do not  scrub.   No baths, swimming, or hot tubs until you receive medical permission    Pain Medication  Take medication as prescribed on the bottle   Do not take narcotics, if you do not have pain. You can take Tylenol if you are not taking Norco. DO NOT take both Tylenol and Norco together as there is already Tylenol in the Norco.  Keep stools soft.  Take a stool softener (Metamucil, Milk of Magnesia, Colace).  Eating fruit will also help to prevent constipation    Over-The-Counter Medication  Non-prescription anti-inflammatory medications can also help to ease the pain.  You can take Aleve or ibuprofen starting 48 hours after surgery  Take as directed on the bottle  Drink a full glass of water with the medication    Home Medication  Resume your home medications as instructed  Do not resume herbal medications for two weeks    Diet  Resume your normal diet    Activity  No strenuous activity or heavy lifting (no lifting over 10 pounds)  No activities that involve your pectoralis muscles (no planks, push-ups, etc)  You can go up and down the stairs as tolerated.   You cannot return to work, if your work requires strenuous activity.  You cannot return to physical exercise, sports, or gym workouts until you are allowed to participate in strenuous activity.    Driving  Do not drive, if you are taking pain medication.    Return to Work or School  You can return to work when you are not taking pain medication, if your work does not involve strenuous activity.  Contact the office, if you need a medical note.     Follow-up Appointment   Our office will call you to set up a time    Questions or Concerns  Call Dr. Ashraf's office if you experience severe pain not controlled by pain medication, swelling, numbness, tingling, bleeding, fever, or other concerns.   If he is not available, another physician will be able to address your concerns.    Chloe     Thank you for coming to [facility] for your operation.  The nurses and the  anesthesiologist try very hard to make sure you receive the best care possible.  Your trust in them is greatly appreciated.      Thanks so much,  Dr. Andriy Allred, FNP-JOSE MARTIN Schofield RN         You have been given a prescription for Norco 5/325  Norco was Given to you at:  Next dose due:    Take this medication as directed  This medication contains Tylenol (acetaminophen)  Do not take additional Tylenol while taking Norco    Norco is a Narcotic and can be constipating or upset your stomach  Don't take Norco on an empty stomach  Drink plenty of water  Alcoholic beverages should be avoided while taking narcotics

## 2024-04-25 NOTE — OPERATIVE REPORT
[facility]    PATIENT'S NAME: YOSEF BINGHAM   ATTENDING PHYSICIAN: Ascencion Ashraf M.D.   OPERATING PHYSICIAN: Ascencion Ashraf M.D.   PATIENT ACCOUNT#:   [id number]    LOCATION:  PACU  PACU [facility]  MEDICAL RECORD #:   [id number]       YOB: 1965  ADMISSION DATE:       04/24/2024      OPERATION DATE:  04/24/2024    OPERATIVE REPORT      PREOPERATIVE DIAGNOSIS:  History of bilateral mastectomy and flap reconstruction.  POSTOPERATIVE DIAGNOSIS:  History of bilateral mastectomy and flap reconstruction.  PROCEDURE:    1.   Revision of right reconstructed breast.  2.   Autologous fat grafting to bilateral reconstructed breasts.  3.   Revision of abdominal scar totaling 6 cm.      ASSISTANT:  RAE Starkey.    ANESTHESIA:  General.    ESTIMATED BLOOD LOSS:  Minimal.    COMPLICATIONS:  None.    INDICATIONS:  Patient is a 58-year-old female who previously underwent bilateral mastectomy and delayed flap reconstruction.  She now presents for revisions.    OPERATIVE TECHNIQUE:  Informed consent was obtained from the patient.  The risks, benefits, and alternatives were reviewed with the patient preoperatively.  She expressed understanding and wished to proceed.  The patient was marked in the preoperative holding area.  She was then taken to the operating room, properly identified, placed in supine position.  Sequential compression devices were placed on bilateral lower extremities.  Intravenous antibiotic prophylaxis was administered.  The patient then underwent successful induction of general anesthesia and endotracheal intubation.  The arms were placed abducted on foam-padded cradles and loosely secured with Kerlix.  The chest, abdomen, and thighs were prepped and draped sterilely.  The proposed liposuction port sites in the lateral abdomen and medial thigh creases were infiltrated with 1% lidocaine with epinephrine.  Stab incisions were then created.  Then, 1 L of tumescent solution was  infiltrated into the flanks and medial thighs.  Attention was then turned to the right breast.  The margins of the skin paddle were incised.  Subcutaneous flaps were then elevated at the junction of the flap dermis and the subcutaneous tissues.  The inframammary fold plication was then marked externally with the patient in the upright position.  The internal capsulorrhaphy of 2-0 Vicryl suture was then performed.  The patient returned to the supine position once the inframammary fold was then adequately corrected.  The skin margins were primarily approximated and it appeared the skin paddle could be excised without undue tension.  The paddle was excised and discarded.  The flaps were then stapled in place.  Some small amount of redundancy was noted at the superior areolar border, and the skin in this area was excised and discarded.  Next, attention was turned to the abdomen and thighs.  Using a 4 mm Mercedes tip cannula, power-assisted liposuction of the flanks and medial thighs was performed.  Approximately 100 mL of lipoaspirate were collected using the Revolve system.  The fat was processed in usual fashion, and the liposuction port sites were closed with 3-0 Vicryl deep dermal sutures.  A dog ear at the right lateral aspect of the abdominal scar was excised and discarded.  The resultant wound was repaired in a layered fashion with 3-0 Vicryl deep sutures and 4-0 Monocryl subcuticular suture.  The length of the closure totaled 6 cm.  The patient then placed in the upright position.  A stab incision was then created at the superior areolar border of the left breast.  Then, 15 mL of fat was then grafted to the premarked areas.  With the patient in the upright position, volume deficiency was noted at the superior medial aspect of the right breast.  Hence, the fat was grafted to the right breast.  A total of 30 mL was placed to the right breast.  The left breast incision was closed with 5-0 plain gut suture.  The  right breast incisions were repaired in layered fashion with 3-0 Vicryl deep dermal sutures and 4-0 Monocryl subcuticular suture.  Dermabond and Steri-Strips were placed on the incisions.  Fluff gauze and a surgical bra were placed on the breasts.  TopiFoam and an abdominal binder were placed on the abdomen.  Kerlix and Ace wraps were placed on the thighs.  The patient was awakened, extubated, and taken to the recovery area in stable condition.  There were no operative complications.  All needle, sponge, and instrument counts were correct at the end of the procedure.     Dictated By Ascencion Ashraf M.D.  d: 04/24/2024 18:28:45  t: 04/24/2024 19:38:09  [facility] [id number]  [facility]/

## 2024-05-02 ENCOUNTER — NURSE ONLY (OUTPATIENT)
Dept: SURGERY | Facility: CLINIC | Age: 59
End: 2024-05-02
Payer: COMMERCIAL

## 2024-05-02 PROCEDURE — 99024 POSTOP FOLLOW-UP VISIT: CPT | Performed by: SURGERY

## 2024-05-02 NOTE — PROGRESS NOTES
Chloe Bennett is a 58 year old female who presents today for a follow-up after revision of right reconstructed breast, autologous fat grafting to the bilateral reconstructed breasts, revisions of abdominal scar on 4/24/2024 by Dr. Ashraf.    She has a history of bilateral LAY flap reconstruction on 7/24/23.     She denies fever and chills. She denies nausea, vomiting, diarrhea or constipation. She denies shortness of breath or chest pain.    Her pain is controlled.        Physical Exam     Breasts: Soft and non-tender. No sign of erythema, ecchymosis or hematoma. Incisions are clean, dry and intact. Bilateral flaps soft and viable.    Abdomen: Soft and appropriately tender. No sign of erythema, ecchymosis or hematoma. Right abdominal scar is healing well without wound separation or wound drainage.     Thighs: Soft and appropriately tender. No sign of erythema or hematoma. Resolving ecchymosis on the bilateral inner thighs consistent with fat grafting.     There were no vitals filed for this visit.      Assessment and Plan     Chloe Bennett is doing well s/p revision of right reconstructed breast, autologous fat grafting to the bilateral reconstructed breasts, revisions of abdominal scar on 4/24/2024 by Dr. Ashraf.    Steri-strips were removed on the right breast incision. Incision was cleaned with alcohol and redressed with new steri-strips. Patient was instructed to keep steri-strips on or replace them if they fall off.     The fat grafting incision on the breasts were redressed with neosporin and a band-aid. She was instructed to change the dressings daily until healed.     The abdominal and thigh fat grafting sites were cleaned with alcohol and were redressed with neosporin and band-aids. She was instructed to change the dressings daily until healed.     Patient was instructed to continue with activity restrictions. She will continue wearing breast compression for the full 6 weeks. She was  instructed she may now discontinue her abdominal and thigh compression or downgrade to compression leggings or spanx per her comfort.     She was instructed to call the office with any questions or concerns.     She will follow up with Dr. Ashraf on 5/31/24.    Questions were answered. Patient understands.     Kanwal Alarcon RN  5/2/2024  11:10am

## 2024-05-31 ENCOUNTER — OFFICE VISIT (OUTPATIENT)
Dept: SURGERY | Facility: CLINIC | Age: 59
End: 2024-05-31

## 2024-05-31 DIAGNOSIS — Z90.13 S/P MASTECTOMY, BILATERAL: Primary | ICD-10-CM

## 2024-05-31 PROCEDURE — 99024 POSTOP FOLLOW-UP VISIT: CPT | Performed by: SURGERY

## 2024-05-31 NOTE — PROGRESS NOTES
Chloe Bennett is a 58 year old female who presents today in follow-up after undergoing revision surgery in April. She denies fever and chills.     Physical Examination:  Breasts:Bilateral breast and abdominal ncisions are clean dry and intact.  There is no erythema or seroma noted.  Acceptable shape and symmetry is noted.    Assessment and Plan:  Patient is doing well.  We discussed scar care including massage and moisturizer and silicone products.  The patient may resume regular activity as tolerated.  She will follow-up in 6 months for final scar check.  The plan was reviewed with the patient and questions were answered.

## 2024-12-13 ENCOUNTER — OFFICE VISIT (OUTPATIENT)
Dept: SURGERY | Facility: CLINIC | Age: 59
End: 2024-12-13
Payer: COMMERCIAL

## 2024-12-13 DIAGNOSIS — Z90.13 S/P MASTECTOMY, BILATERAL: Primary | ICD-10-CM

## 2024-12-13 NOTE — PROGRESS NOTES
Chloe Bennett is a 59 year old female who presents today for a scar check after undergoing revision of her autologous reconstruction in April.  She reports a \"bubble\" at the lateral aspect of her right breast.  She is otherwise in usual state of health and has resumed regular activity.    Physical Examination:  Breasts: Bilateral breast flaps are warm and soft.  The right breast is noted to have a step-off at the superior lateral aspect of the breast flap.  There are no palpable nodules noted.  Acceptable shape and symmetry is noted.  Abdomen is flat without palpable hernia.    Assessment and Plan:  Patient is doing well.  She may follow-up on a as needed basis but she was offered return at any time should she develop any new questions or concerns.  The plan was reviewed with the patient and  and questions were answered.

## (undated) DEVICE — SYRINGE MED 5ML STD CLR PLAS LL TIP N CTRL

## (undated) DEVICE — PLASTIC BREAST CDS-LF: Brand: MEDLINE INDUSTRIES, INC.

## (undated) DEVICE — PROXIMATE SKIN STAPLERS (35 WIDE) CONTAINS 35 STAINLESS STEEL STAPLES (FIXED HEAD): Brand: PROXIMATE

## (undated) DEVICE — SUT PLN GUT 5-0 18IN PC-1 ABSRB TAN YELLOWISH

## (undated) DEVICE — 40580 - THE PINK PAD - ADVANCED TRENDELENBURG POSITIONING KIT: Brand: 40580 - THE PINK PAD - ADVANCED TRENDELENBURG POSITIONING KIT

## (undated) DEVICE — SYSTEM ADIPOSE PROC AUTOLGS ADV INCL CANSTR

## (undated) DEVICE — SOLUTION IRRIG 1000ML 0.9% NACL USP BTL

## (undated) DEVICE — 3M™ IOBAN™ 2 ANTIMICROBIAL INCISE DRAPE 6651EZ: Brand: IOBAN™ 2

## (undated) DEVICE — ELECTRODE ES 2.75IN PTFE BLDE MOD E-Z CLN

## (undated) DEVICE — SUT MCRYL 4-0 27IN ABSRB UD 19MM PS-2 3/8

## (undated) DEVICE — 3M™ STERI-STRIP™ REINFORCED ADHESIVE SKIN CLOSURES, R1548, 1 IN X 5 IN (25 MM X 125 MM), 4 STRIPS/ENVELOPE: Brand: 3M™ STERI-STRIP™

## (undated) DEVICE — SYRINGE MED 50ML LL TIP DISP

## (undated) DEVICE — ASPIRATION TUBING SET, DISPOSABLE: Brand: MICROAIRE®

## (undated) DEVICE — SYRINGE,TOOMEY,IRRIGATION,70CC,STERILE: Brand: MEDLINE

## (undated) DEVICE — SLEEVE COMPR MD KNEE LEN SGL USE KENDALL SCD

## (undated) DEVICE — SOLUTION PREP 4OZ 10% POVIDONE IOD SCR TOP

## (undated) DEVICE — GLOVE SUR 6.5 SENSICARE PI PIP CRM PWD F

## (undated) DEVICE — GLOVE SUR 7.5 SENSICARE PI PIP CRM PWD F

## (undated) DEVICE — CG INFILTRATION TUBING: Brand: CG INFILTRATION TUBING

## (undated) DEVICE — VIOLET BRAIDED (POLYGLACTIN 910), SYNTHETIC ABSORBABLE SUTURE: Brand: COATED VICRYL

## (undated) DEVICE — SUT COAT VCRL 3-0 27IN SH ABSRB UD 26MM 1/2

## (undated) DEVICE — MARKER SKIN PREP RESIST STRL

## (undated) DEVICE — ANTIBACTERIAL UNDYED BRAIDED (POLYGLACTIN 910), SYNTHETIC ABSORBABLE SUTURE: Brand: COATED VICRYL

## (undated) DEVICE — SHEET,DRAPE,40X58,STERILE: Brand: MEDLINE

## (undated) DEVICE — DRAPE,TOWEL,LARGE,INVISISHIELD: Brand: MEDLINE

## (undated) DEVICE — ADHESIVE SKIN TOP FOR WND CLSR DERMBND ADV

## (undated) NOTE — LETTER
OUTSIDE TESTING RESULT REQUEST     IMPORTANT: FOR YOUR IMMEDIATE ATTENTION  Please FAX all test results listed below to: [phone]     Testing already done on or about: 23     * * * * If testing is NOT complete, arrange with patient A.S.A.P. * * * *      Patient Name: Israel Bell  Surgery Date: 2023  Medical Record: [id number]  CSN: [id number]  : 1965 - A: 59 y     Sex: female  Surgeon(s):  MD Bon Guzmán MD  Procedure: Delayed breast reconstruction with bilateral Deep inferior epigastric   flap.   Anesthesia Type: General     Surgeon: Cary Henry MD     The following Testing and Time Line are REQUIRED PER ANESTHESIA     EKG READ AND SIGNED WITHIN   90 days      Thank You,   Sent by:BETSY

## (undated) NOTE — LETTER
OUTSIDE TESTING RESULT REQUEST     IMPORTANT: FOR YOUR IMMEDIATE ATTENTION  Please FAX all test results listed below to: [phone]     Testing already done on or about: 24     * * * * If testing is NOT complete, arrange with patient A.S.A.P. * * * *      Patient Name: Chloe Bennett  Surgery Date: 2024  Medical Record: [id number]  CSN: [id number]  : 1965 - A: 58 y     Sex: female  Surgeon(s):  Ascencion Ashraf MD  Procedure: Revision of right reconstructed breast, autologous fat grafting to the left reconstructed breast  Anesthesia Type: General     Surgeon: Ascencion Ashraf MD     The following Testing and Time Line are REQUIRED PER ANESTHESIA     EKG READ AND SIGNED WITHIN   90 days  BMP (requires 4 hour fast) within  90 days      Thank You,   Sent by: TRANG Pascual

## (undated) NOTE — LETTER
[address]  [address]  Authorization for Surgical Operation and Procedure     Date:___________                                                                                                         Time:__________  I hereby authorize Surgeon(s):  Ascencion Ashraf MD, my physician and his/her assistants (if applicable), which may include medical students, residents, and/or fellows, to perform the following surgical operation/ procedure and administer such anesthesia as may be determined necessary by my physician:  Operation/Procedure name (s) Procedure(s):  Revision of right reconstructed breast, autologous fat grafting to the left reconstructed breast  . on Chloeishmael Bennett   2.   I recognize that during the surgical operation/procedure, unforeseen conditions may necessitate additional or different procedures than those listed above.  I, therefore, further authorize and request that the above-named surgeon, assistants, or designees perform such procedures as are, in their judgment, necessary and desirable.    3.   My surgeon/physician has discussed prior to my surgery the potential benefits, risks and side effects of this procedure; the likelihood of achieving goals; and potential problems that might occur during recuperation.  They also discussed reasonable alternatives to the procedure, including risks, benefits, and side effects related to the alternatives and risks related to not receiving this procedure.  I have had all my questions answered and I acknowledge that no guarantee has been made as to the result that may be obtained.    4.   Should the need arise during my operation/procedure, which includes change of level of care prior to discharge, I also consent to the administration of blood and/or blood products.  Further, I understand that despite careful testing and screening of blood or blood products by collecting agencies, I may still be subject to ill effects  as a result of receiving a blood transfusion and/or blood products.  The following are some, but not all, of the potential risks that can occur: fever and allergic reactions, hemolytic reactions, transmission of diseases such as Hepatitis, AIDS and Cytomegalovirus (CMV) and fluid overload.  In the event that I wish to have an autologous transfusion of my own blood, or a directed donor transfusion, I will discuss this with my physician.  Check only if Refusing Blood or Blood Products  I understand refusal of blood or blood products as deemed necessary by my physician may have serious consequences to my condition to include possible death. I hereby assume responsibility for my refusal and release the hospital, its personnel, and my physicians from any responsibility for the consequences of my refusal.          o  Refuse      5.   I authorize the use of any specimen, organs, tissues, body parts or foreign objects that may be removed from my body during the operation/procedure for diagnosis, research or teaching purposes and their subsequent disposal by hospital authorities.  I also authorize the release of specimen test results and/or written reports to my treating physician on the hospital medical staff or other referring or consulting physicians involved in my care, at the discretion of the Pathologist or my treating physician.    6.   I consent to the photographing or videotaping of the operations or procedures to be performed, including appropriate portions of my body for medical, scientific, or educational purposes, provided my identity is not revealed by the pictures or by descriptive texts accompanying them.  If the procedure has been photographed/videotaped, the surgeon will obtain the original picture, image, videotape or CD.  The hospital will not be responsible for storage, release or maintenance of the picture, image, tape or CD.    7.   I consent to the presence of a  or observers in the  operating room as deemed necessary by my physician or their designees.    8.   I recognize that in the event my procedure results in extended X-Ray/fluoroscopy time, I may develop a skin reaction.    9. If I have a Do Not Attempt Resuscitation (DNAR) order in place, that status will be suspended while in the operating room, procedural suite, and during the recovery period unless otherwise explicitly stated by me (or a person authorized to consent on my behalf). The surgeon or my attending physician will determine when the applicable recovery period ends for purposes of reinstating the DNAR order.  10. Patients having a sterilization procedure: I understand that if the procedure is successful the results will be permanent and it will therefore be impossible for me to inseminate, conceive, or bear children.  I also understand that the procedure is intended to result in sterility, although the result has not been guaranteed.   11. I acknowledge that my physician has explained sedation/analgesia administration to me including the risk and benefits I consent to the administration of sedation/analgesia as may be necessary or desirable in the judgment of my physician.    I CERTIFY THAT I HAVE READ AND FULLY UNDERSTAND THE ABOVE CONSENT TO OPERATION and/or OTHER PROCEDURE.    _________________________________________  __________________________________  Signature of Patient     Signature of Responsible Person         ___________________________________         Printed Name of Responsible Person           _________________________________                 Relationship to Patient  _________________________________________  ______________________________  Signature of Witness          Date  Time      Patient Name: Chloe Bennett     : 1965                 Printed: 2024     Medical Record #: [id number]                     Page 1 of 2                                    [address]  [place]  [address]    Consent for Anesthesia    IChloe agree to be cared for by an anesthesiologist, who is specially trained to monitor me and give me medicine to put me to sleep or keep me comfortable during my procedure    I understand that my anesthesiologist is not an employee or agent of [facility] or [employer]. He or she works for [employer].    As the patient asking for anesthesia services, I agree to:  Allow the anesthesiologist (anesthesia doctor) to give me medicine and do additional procedures as necessary. Some examples are: Starting or using an “IV” to give me medicine, fluids or blood during my procedure, and having a breathing tube placed to help me breathe when I’m asleep (intubation). In the event that my heart stops working properly, I understand that my anesthesiologist will make every effort to sustain my life, unless otherwise directed by [facility] Do Not Resuscitate documents.  Tell my anesthesia doctor before my procedure:  If I am pregnant.  The last time that I ate or drank.  All of the medicines I take (including prescriptions, herbal supplements, and pills I can buy without a prescription (including street drugs/illegal medications). Failure to inform my anesthesiologist about these medicines may increase my risk of anesthetic complications.  If I am allergic to anything or have had a reaction to anesthesia before.  I understand how the anesthesia medicine will help me (benefits).  I understand that with any type of anesthesia medicine there are risks:  The most common risks are: nausea, vomiting, sore throat, muscle soreness, damage to my eyes, mouth, or teeth (from breathing tube placement).  Rare risks include: remembering what happened during my procedure, allergic reactions to medications, injury to my airway, heart, lungs, vision, nerves, or muscles and in extremely rare instances death.  My doctor has explained to  me other choices available to me for my care (alternatives).  Pregnant Patients (“epidural”):  I understand that the risks of having an epidural (medicine given into my back to help control pain during labor), include itching, low blood pressure, difficulty urinating, headache or slowing of the baby’s heart. Very rare risks include infection, bleeding, seizure, irregular heart rhythms and nerve injury.  Regional Anesthesia (“spinal”, “epidural”, & “nerve blocks”):  I understand that rare but potential complications include headache, bleeding, infection, seizure, irregular heart rhythms, and nerve injury.    I can change my mind about having anesthesia services at any time before I get the medicine.    _____________________________________________________________________________  Patient (or Representative) Signature/Relationship to Patient  Date   Time    _____________________________________________________________________________   Name (if used)    Language/Organization   Time    _____________________________________________________________________________  Anesthesiologist Signature     Date   Time  I have discussed the procedure and information above with the patient (or patient’s representative) and answered their questions. The patient or their representative has agreed to have anesthesia services.    _____________________________________________________________________________  Witness        Date   Time  I have verified that the signature is that of the patient or patient’s representative, and that it was signed before the procedure  Patient Name: Chloe Bennett     : 1965                 Printed: 2024     Medical Record #: [id number]                     Page 2 of 2

## (undated) NOTE — LETTER
[address]  [address]  Authorization for Surgical Operation and Procedure     Date:___________                                                                                                         Time:__________  I hereby authorize Surgeon(s):  Ascencion Ashraf MD, my physician and his/her assistants (if applicable), which may include medical students, residents, and/or fellows, to perform the following surgical operation/ procedure and administer such anesthesia as may be determined necessary by my physician:  Operation/Procedure name (s) Procedure(s):  Revision of right reconstructed breast, autologous fat grafting to the left reconstructed breast, Revision of abdominal scar  . on Chloe Bennett   2.   I recognize that during the surgical operation/procedure, unforeseen conditions may necessitate additional or different procedures than those listed above.  I, therefore, further authorize and request that the above-named surgeon, assistants, or designees perform such procedures as are, in their judgment, necessary and desirable.    3.   My surgeon/physician has discussed prior to my surgery the potential benefits, risks and side effects of this procedure; the likelihood of achieving goals; and potential problems that might occur during recuperation.  They also discussed reasonable alternatives to the procedure, including risks, benefits, and side effects related to the alternatives and risks related to not receiving this procedure.  I have had all my questions answered and I acknowledge that no guarantee has been made as to the result that may be obtained.    4.   Should the need arise during my operation/procedure, which includes change of level of care prior to discharge, I also consent to the administration of blood and/or blood products.  Further, I understand that despite careful testing and screening of blood or blood products by collecting agencies, I may still  be subject to ill effects as a result of receiving a blood transfusion and/or blood products.  The following are some, but not all, of the potential risks that can occur: fever and allergic reactions, hemolytic reactions, transmission of diseases such as Hepatitis, AIDS and Cytomegalovirus (CMV) and fluid overload.  In the event that I wish to have an autologous transfusion of my own blood, or a directed donor transfusion, I will discuss this with my physician.  Check only if Refusing Blood or Blood Products  I understand refusal of blood or blood products as deemed necessary by my physician may have serious consequences to my condition to include possible death. I hereby assume responsibility for my refusal and release the hospital, its personnel, and my physicians from any responsibility for the consequences of my refusal.          o  Refuse      5.   I authorize the use of any specimen, organs, tissues, body parts or foreign objects that may be removed from my body during the operation/procedure for diagnosis, research or teaching purposes and their subsequent disposal by hospital authorities.  I also authorize the release of specimen test results and/or written reports to my treating physician on the hospital medical staff or other referring or consulting physicians involved in my care, at the discretion of the Pathologist or my treating physician.    6.   I consent to the photographing or videotaping of the operations or procedures to be performed, including appropriate portions of my body for medical, scientific, or educational purposes, provided my identity is not revealed by the pictures or by descriptive texts accompanying them.  If the procedure has been photographed/videotaped, the surgeon will obtain the original picture, image, videotape or CD.  The hospital will not be responsible for storage, release or maintenance of the picture, image, tape or CD.    7.   I consent to the presence of a product  specialist or observers in the operating room as deemed necessary by my physician or their designees.    8.   I recognize that in the event my procedure results in extended X-Ray/fluoroscopy time, I may develop a skin reaction.    9. If I have a Do Not Attempt Resuscitation (DNAR) order in place, that status will be suspended while in the operating room, procedural suite, and during the recovery period unless otherwise explicitly stated by me (or a person authorized to consent on my behalf). The surgeon or my attending physician will determine when the applicable recovery period ends for purposes of reinstating the DNAR order.  10. Patients having a sterilization procedure: I understand that if the procedure is successful the results will be permanent and it will therefore be impossible for me to inseminate, conceive, or bear children.  I also understand that the procedure is intended to result in sterility, although the result has not been guaranteed.   11. I acknowledge that my physician has explained sedation/analgesia administration to me including the risk and benefits I consent to the administration of sedation/analgesia as may be necessary or desirable in the judgment of my physician.    I CERTIFY THAT I HAVE READ AND FULLY UNDERSTAND THE ABOVE CONSENT TO OPERATION and/or OTHER PROCEDURE.    _________________________________________  __________________________________  Signature of Patient     Signature of Responsible Person         ___________________________________         Printed Name of Responsible Person           _________________________________                 Relationship to Patient  _________________________________________  ______________________________  Signature of Witness          Date  Time      Patient Name: Chloe Bennett     : 1965                 Printed: 2024     Medical Record #: [id number]                     Page 2 of 3                                    [place]  [address]  [address]    Consent for Anesthesia    I, Chloe Bennett agree to be cared for by an anesthesiologist, who is specially trained to monitor me and give me medicine to put me to sleep or keep me comfortable during my procedure    I understand that my anesthesiologist is not an employee or agent of [facility] or [employer]. He or she works for [employer].    As the patient asking for anesthesia services, I agree to:  Allow the anesthesiologist (anesthesia doctor) to give me medicine and do additional procedures as necessary. Some examples are: Starting or using an “IV” to give me medicine, fluids or blood during my procedure, and having a breathing tube placed to help me breathe when I’m asleep (intubation). In the event that my heart stops working properly, I understand that my anesthesiologist will make every effort to sustain my life, unless otherwise directed by [facility] Do Not Resuscitate documents.  Tell my anesthesia doctor before my procedure:  If I am pregnant.  The last time that I ate or drank.  All of the medicines I take (including prescriptions, herbal supplements, and pills I can buy without a prescription (including street drugs/illegal medications). Failure to inform my anesthesiologist about these medicines may increase my risk of anesthetic complications.  If I am allergic to anything or have had a reaction to anesthesia before.  I understand how the anesthesia medicine will help me (benefits).  I understand that with any type of anesthesia medicine there are risks:  The most common risks are: nausea, vomiting, sore throat, muscle soreness, damage to my eyes, mouth, or teeth (from breathing tube placement).  Rare risks include: remembering what happened during my procedure, allergic reactions to medications, injury to my airway, heart, lungs, vision, nerves, or muscles and in extremely rare instances  death.  My doctor has explained to me other choices available to me for my care (alternatives).  Pregnant Patients (“epidural”):  I understand that the risks of having an epidural (medicine given into my back to help control pain during labor), include itching, low blood pressure, difficulty urinating, headache or slowing of the baby’s heart. Very rare risks include infection, bleeding, seizure, irregular heart rhythms and nerve injury.  Regional Anesthesia (“spinal”, “epidural”, & “nerve blocks”):  I understand that rare but potential complications include headache, bleeding, infection, seizure, irregular heart rhythms, and nerve injury.    I can change my mind about having anesthesia services at any time before I get the medicine.    _____________________________________________________________________________  Patient (or Representative) Signature/Relationship to Patient  Date   Time    _____________________________________________________________________________   Name (if used)    Language/Organization   Time    _____________________________________________________________________________  Anesthesiologist Signature     Date   Time  I have discussed the procedure and information above with the patient (or patient’s representative) and answered their questions. The patient or their representative has agreed to have anesthesia services.    _____________________________________________________________________________  Witness        Date   Time  I have verified that the signature is that of the patient or patient’s representative, and that it was signed before the procedure  Patient Name: Chloe Bennett     : 1965                 Printed: 2024     Medical Record #: [id number]                     Page 3 of 3